# Patient Record
Sex: FEMALE | Race: WHITE | Employment: UNEMPLOYED | URBAN - METROPOLITAN AREA
[De-identification: names, ages, dates, MRNs, and addresses within clinical notes are randomized per-mention and may not be internally consistent; named-entity substitution may affect disease eponyms.]

---

## 2021-04-19 PROCEDURE — 88305 TISSUE EXAM BY PATHOLOGIST: CPT | Performed by: RADIOLOGY

## 2021-12-15 ENCOUNTER — HOSPITAL ENCOUNTER (EMERGENCY)
Age: 41
Discharge: HOME | End: 2021-12-15
Attending: EMERGENCY MEDICINE
Payer: COMMERCIAL

## 2021-12-15 VITALS
DIASTOLIC BLOOD PRESSURE: 66 MMHG | HEART RATE: 84 BPM | BODY MASS INDEX: 25.71 KG/M2 | HEIGHT: 66 IN | OXYGEN SATURATION: 97 % | RESPIRATION RATE: 16 BRPM | TEMPERATURE: 98 F | WEIGHT: 160 LBS | SYSTOLIC BLOOD PRESSURE: 105 MMHG

## 2021-12-15 DIAGNOSIS — M62.830 BACK MUSCLE SPASM: Primary | ICD-10-CM

## 2021-12-15 PROCEDURE — 99283 EMERGENCY DEPT VISIT LOW MDM: CPT | Performed by: EMERGENCY MEDICINE

## 2021-12-15 PROCEDURE — 96372 THER/PROPH/DIAG INJ SC/IM: CPT | Performed by: EMERGENCY MEDICINE

## 2021-12-15 RX ORDER — KETOROLAC TROMETHAMINE 30 MG/ML
60 INJECTION, SOLUTION INTRAMUSCULAR; INTRAVENOUS ONCE
Status: COMPLETED | OUTPATIENT
Start: 2021-12-15 | End: 2021-12-15

## 2021-12-15 RX ORDER — CYCLOBENZAPRINE HCL 10 MG
10 TABLET ORAL 3 TIMES DAILY PRN
Qty: 20 TABLET | Refills: 0 | Status: SHIPPED | OUTPATIENT
Start: 2021-12-15 | End: 2021-12-22

## 2021-12-15 NOTE — ED INITIAL ASSESSMENT (HPI)
l sided back pain for about one week- worse with movement- certain movement she feels pain wrap around to front of chest- no known injury

## 2021-12-15 NOTE — ED PROVIDER NOTES
Patient Seen in: THE [facility] Emergency Department In [place]      History   Patient presents with:  Back Pain    Stated Complaint: back pain    Subjective:   HPI    27-year-old female presents emergency room with chief complaint of left mid/upper back pain Triage Vitals [12/15/21 0802]   /66   Pulse 84   Resp 16   Temp 98.2 °F (36.8 °C)   Temp src Temporal   SpO2 97 %   O2 Device None (Room air)       Current:/66   Pulse 84   Temp 98.2 °F (36.8 °C) (Temporal)   Resp 16   Ht 167.6 cm (5' 6\")   Wt Discharge Medication List    START taking these medications    cyclobenzaprine 10 MG Oral Tab  Take 1 tablet (10 mg total) by mouth 3 (three) times daily as needed for Muscle spasms.   Qty: 20 tablet Refills: 0

## 2021-12-18 ENCOUNTER — HOSPITAL ENCOUNTER (EMERGENCY)
Facility: HOSPITAL | Age: 41
Discharge: HOME | End: 2021-12-18
Attending: EMERGENCY MEDICINE
Payer: COMMERCIAL

## 2021-12-18 ENCOUNTER — APPOINTMENT (OUTPATIENT)
Dept: GENERAL RADIOLOGY | Facility: HOSPITAL | Age: 41
End: 2021-12-18
Attending: EMERGENCY MEDICINE
Payer: COMMERCIAL

## 2021-12-18 VITALS
WEIGHT: 160 LBS | HEIGHT: 66 IN | DIASTOLIC BLOOD PRESSURE: 60 MMHG | HEART RATE: 61 BPM | TEMPERATURE: 99 F | SYSTOLIC BLOOD PRESSURE: 106 MMHG | BODY MASS INDEX: 25.71 KG/M2 | OXYGEN SATURATION: 96 % | RESPIRATION RATE: 16 BRPM

## 2021-12-18 DIAGNOSIS — R07.9 CHEST PAIN OF UNCERTAIN ETIOLOGY: Primary | ICD-10-CM

## 2021-12-18 PROCEDURE — 93010 ELECTROCARDIOGRAM REPORT: CPT

## 2021-12-18 PROCEDURE — 71045 X-RAY EXAM CHEST 1 VIEW: CPT | Performed by: EMERGENCY MEDICINE

## 2021-12-18 PROCEDURE — 99284 EMERGENCY DEPT VISIT MOD MDM: CPT

## 2021-12-18 PROCEDURE — 80053 COMPREHEN METABOLIC PANEL: CPT | Performed by: EMERGENCY MEDICINE

## 2021-12-18 PROCEDURE — 84484 ASSAY OF TROPONIN QUANT: CPT | Performed by: EMERGENCY MEDICINE

## 2021-12-18 PROCEDURE — 93005 ELECTROCARDIOGRAM TRACING: CPT

## 2021-12-18 PROCEDURE — 85025 COMPLETE CBC W/AUTO DIFF WBC: CPT | Performed by: EMERGENCY MEDICINE

## 2021-12-18 PROCEDURE — 36415 COLL VENOUS BLD VENIPUNCTURE: CPT

## 2021-12-18 PROCEDURE — 83690 ASSAY OF LIPASE: CPT | Performed by: EMERGENCY MEDICINE

## 2021-12-18 PROCEDURE — 81003 URINALYSIS AUTO W/O SCOPE: CPT | Performed by: EMERGENCY MEDICINE

## 2021-12-18 PROCEDURE — 85379 FIBRIN DEGRADATION QUANT: CPT | Performed by: EMERGENCY MEDICINE

## 2021-12-18 NOTE — ED PROVIDER NOTES
Patient Seen in: [facility] Emergency Department      History   Patient presents with:  Abnormal Result  Dizziness    Stated Complaint: here from [address] [place] [address] for abnormal EKG    Subjective:   HPI    The patient is a 45-year-old female with a history of MTHFR used    Alcohol use: Never    Drug use: Never             Review of Systems    Positive for stated complaint: here from [facility] for abnormal EKG  Other systems are as noted in HPI. Constitutional and vital signs reviewed.       All other systems reviewed and ne ALT 58 (*)     All other components within normal limits   CBC W/ DIFFERENTIAL - Abnormal; Notable for the following components:    Lymphocyte Absolute 4.23 (*)     All other components within normal limits   TROPONIN I HIGH SENSITIVITY - Normal   D-DIM within     normal limits. Mediastinum and mauricio are unremarkable.   Chest wall     structures are unremarkable.                               =====    CONCLUSION:  There is no evidence of active cardiopulmonary disease on     this single portable chest radi

## 2021-12-18 NOTE — ED INITIAL ASSESSMENT (HPI)
Pt arrives ambulatory to triage, states she went to [facility] for generalized weakness x1 week, wanted to rule out covid or sinus infection. ICC noted abnormal ECG, here it is NSR. Was at [facility] for same 3 days ago. A&O x4.

## 2023-01-11 ENCOUNTER — APPOINTMENT (OUTPATIENT)
Dept: CT IMAGING | Age: 43
End: 2023-01-11
Attending: EMERGENCY MEDICINE
Payer: COMMERCIAL

## 2023-01-11 ENCOUNTER — HOSPITAL ENCOUNTER (EMERGENCY)
Age: 43
Discharge: HOME | End: 2023-01-11
Attending: EMERGENCY MEDICINE
Payer: COMMERCIAL

## 2023-01-11 VITALS
DIASTOLIC BLOOD PRESSURE: 54 MMHG | WEIGHT: 170 LBS | HEART RATE: 70 BPM | SYSTOLIC BLOOD PRESSURE: 98 MMHG | TEMPERATURE: 99 F | BODY MASS INDEX: 27.32 KG/M2 | OXYGEN SATURATION: 98 % | RESPIRATION RATE: 18 BRPM | HEIGHT: 66 IN

## 2023-01-11 DIAGNOSIS — R42 VERTIGO: Primary | ICD-10-CM

## 2023-01-11 LAB
ALBUMIN SERPL-MCNC: 3.6 G/DL (ref 3.4–5)
ALBUMIN/GLOB SERPL: 1 {RATIO} (ref 1–2)
ALP LIVER SERPL-CCNC: 72 U/L
ALT SERPL-CCNC: 35 U/L
ANION GAP SERPL CALC-SCNC: 3 MMOL/L (ref 0–18)
AST SERPL-CCNC: 43 U/L (ref 15–37)
ATRIAL RATE: 71 BPM
B-HCG UR QL: NEGATIVE
BASOPHILS # BLD AUTO: 0.05 X10(3) UL (ref 0–0.2)
BASOPHILS NFR BLD AUTO: 0.4 %
BILIRUB SERPL-MCNC: 0.4 MG/DL (ref 0.1–2)
BUN BLD-MCNC: 14 MG/DL (ref 7–18)
CALCIUM BLD-MCNC: 9 MG/DL (ref 8.5–10.1)
CHLORIDE SERPL-SCNC: 107 MMOL/L (ref 98–112)
CO2 SERPL-SCNC: 27 MMOL/L (ref 21–32)
CREAT BLD-MCNC: 0.75 MG/DL
EOSINOPHIL # BLD AUTO: 0.22 X10(3) UL (ref 0–0.7)
EOSINOPHIL NFR BLD AUTO: 1.7 %
ERYTHROCYTE [DISTWIDTH] IN BLOOD BY AUTOMATED COUNT: 13.1 %
GFR SERPLBLD BASED ON 1.73 SQ M-ARVRAT: 103 ML/MIN/1.73M2 (ref 60–?)
GLOBULIN PLAS-MCNC: 3.5 G/DL (ref 2.8–4.4)
GLUCOSE BLD-MCNC: 103 MG/DL (ref 70–99)
HCT VFR BLD AUTO: 42.4 %
HGB BLD-MCNC: 14.6 G/DL
IMM GRANULOCYTES # BLD AUTO: 0.05 X10(3) UL (ref 0–1)
IMM GRANULOCYTES NFR BLD: 0.4 %
LYMPHOCYTES # BLD AUTO: 3.66 X10(3) UL (ref 1–4)
LYMPHOCYTES NFR BLD AUTO: 28.8 %
MCH RBC QN AUTO: 31 PG (ref 26–34)
MCHC RBC AUTO-ENTMCNC: 34.4 G/DL (ref 31–37)
MCV RBC AUTO: 90 FL
MONOCYTES # BLD AUTO: 0.95 X10(3) UL (ref 0.1–1)
MONOCYTES NFR BLD AUTO: 7.5 %
NEUTROPHILS # BLD AUTO: 7.8 X10 (3) UL (ref 1.5–7.7)
NEUTROPHILS # BLD AUTO: 7.8 X10(3) UL (ref 1.5–7.7)
NEUTROPHILS NFR BLD AUTO: 61.2 %
OSMOLALITY SERPL CALC.SUM OF ELEC: 285 MOSM/KG (ref 275–295)
P AXIS: 82 DEGREES
P-R INTERVAL: 142 MS
PLATELET # BLD AUTO: 334 10(3)UL (ref 150–450)
POTASSIUM SERPL-SCNC: 4.6 MMOL/L (ref 3.5–5.1)
PROT SERPL-MCNC: 7.1 G/DL (ref 6.4–8.2)
Q-T INTERVAL: 384 MS
QRS DURATION: 92 MS
QTC CALCULATION (BEZET): 417 MS
R AXIS: 31 DEGREES
RBC # BLD AUTO: 4.71 X10(6)UL
SODIUM SERPL-SCNC: 137 MMOL/L (ref 136–145)
T AXIS: 30 DEGREES
VENTRICULAR RATE: 71 BPM
WBC # BLD AUTO: 12.7 X10(3) UL (ref 4–11)

## 2023-01-11 PROCEDURE — 96374 THER/PROPH/DIAG INJ IV PUSH: CPT

## 2023-01-11 PROCEDURE — 70498 CT ANGIOGRAPHY NECK: CPT | Performed by: EMERGENCY MEDICINE

## 2023-01-11 PROCEDURE — 96375 TX/PRO/DX INJ NEW DRUG ADDON: CPT

## 2023-01-11 PROCEDURE — 99285 EMERGENCY DEPT VISIT HI MDM: CPT

## 2023-01-11 PROCEDURE — 80053 COMPREHEN METABOLIC PANEL: CPT | Performed by: EMERGENCY MEDICINE

## 2023-01-11 PROCEDURE — 70496 CT ANGIOGRAPHY HEAD: CPT | Performed by: EMERGENCY MEDICINE

## 2023-01-11 PROCEDURE — 93005 ELECTROCARDIOGRAM TRACING: CPT

## 2023-01-11 PROCEDURE — 81025 URINE PREGNANCY TEST: CPT

## 2023-01-11 PROCEDURE — 93010 ELECTROCARDIOGRAM REPORT: CPT

## 2023-01-11 PROCEDURE — 85025 COMPLETE CBC W/AUTO DIFF WBC: CPT | Performed by: EMERGENCY MEDICINE

## 2023-01-11 RX ORDER — CEFDINIR 300 MG/1
300 CAPSULE ORAL 2 TIMES DAILY
COMMUNITY

## 2023-01-11 RX ORDER — MECLIZINE HCL 12.5 MG/1
12.5 TABLET ORAL 3 TIMES DAILY PRN
COMMUNITY

## 2023-01-11 RX ORDER — LORAZEPAM 2 MG/ML
1 INJECTION INTRAMUSCULAR ONCE
Status: COMPLETED | OUTPATIENT
Start: 2023-01-11 | End: 2023-01-11

## 2023-01-11 RX ORDER — DIAZEPAM 5 MG/1
5 TABLET ORAL 3 TIMES DAILY PRN
Qty: 20 TABLET | Refills: 0 | Status: SHIPPED | OUTPATIENT
Start: 2023-01-11 | End: 2023-01-14

## 2023-01-11 RX ORDER — ONDANSETRON 2 MG/ML
4 INJECTION INTRAMUSCULAR; INTRAVENOUS ONCE
Status: COMPLETED | OUTPATIENT
Start: 2023-01-11 | End: 2023-01-11

## 2023-01-11 RX ORDER — MECLIZINE HYDROCHLORIDE 25 MG/1
25 TABLET ORAL 3 TIMES DAILY PRN
Qty: 20 TABLET | Refills: 0 | Status: SHIPPED | OUTPATIENT
Start: 2023-01-11

## 2023-01-11 RX ORDER — AZITHROMYCIN 250 MG/1
TABLET, FILM COATED ORAL
Qty: 6 TABLET | Refills: 0 | Status: SHIPPED | OUTPATIENT
Start: 2023-01-11 | End: 2023-01-16

## 2023-03-01 PROBLEM — R07.89 ATYPICAL CHEST PAIN: Status: ACTIVE | Noted: 2022-04-07

## 2023-03-01 PROBLEM — B35.4 TINEA CORPORIS: Status: ACTIVE | Noted: 2022-09-14

## 2023-03-01 PROBLEM — M89.9 DISORDER OF BONE: Status: ACTIVE | Noted: 2022-04-07

## 2023-03-01 PROBLEM — M48.00 SPINAL STENOSIS: Status: ACTIVE | Noted: 2022-04-07

## 2023-03-01 PROBLEM — M25.511 CHRONIC RIGHT SHOULDER PAIN: Status: ACTIVE | Noted: 2019-01-03

## 2023-03-01 PROBLEM — L25.9 CONTACT DERMATITIS: Status: ACTIVE | Noted: 2022-04-07

## 2023-03-01 PROBLEM — M47.812 SPONDYLOSIS OF CERVICAL SPINE: Status: ACTIVE | Noted: 2019-01-03

## 2023-03-01 PROBLEM — R42 DIZZINESS: Status: ACTIVE | Noted: 2022-04-07

## 2023-03-01 PROBLEM — M25.559 ARTHRALGIA OF HIP: Status: ACTIVE | Noted: 2022-04-07

## 2023-03-01 PROBLEM — H65.90 FLUID LEVEL BEHIND TYMPANIC MEMBRANE: Status: ACTIVE | Noted: 2022-04-07

## 2023-03-01 PROBLEM — H65.00: Status: ACTIVE | Noted: 2022-09-14

## 2023-03-01 PROBLEM — G89.29 CHRONIC RIGHT SHOULDER PAIN: Status: ACTIVE | Noted: 2019-01-03

## 2023-03-02 ENCOUNTER — OFFICE VISIT (OUTPATIENT)
Dept: SURGERY | Facility: CLINIC | Age: 43
End: 2023-03-02
Payer: COMMERCIAL

## 2023-03-02 ENCOUNTER — TELEPHONE (OUTPATIENT)
Dept: SURGERY | Facility: CLINIC | Age: 43
End: 2023-03-02

## 2023-03-02 VITALS
HEIGHT: 66 IN | BODY MASS INDEX: 27.32 KG/M2 | WEIGHT: 170 LBS | SYSTOLIC BLOOD PRESSURE: 116 MMHG | DIASTOLIC BLOOD PRESSURE: 62 MMHG | HEART RATE: 86 BPM

## 2023-03-02 DIAGNOSIS — M54.2 CERVICAL PAIN: ICD-10-CM

## 2023-03-02 DIAGNOSIS — M47.812 CERVICAL SPONDYLOSIS: Primary | ICD-10-CM

## 2023-03-02 DIAGNOSIS — M51.16 LUMBAR DISC HERNIATION WITH RADICULOPATHY: ICD-10-CM

## 2023-03-02 DIAGNOSIS — M47.816 LUMBAR SPONDYLOSIS: ICD-10-CM

## 2023-03-02 PROCEDURE — 3008F BODY MASS INDEX DOCD: CPT | Performed by: PHYSICIAN ASSISTANT

## 2023-03-02 PROCEDURE — 99204 OFFICE O/P NEW MOD 45 MIN: CPT | Performed by: PHYSICIAN ASSISTANT

## 2023-03-02 PROCEDURE — 3078F DIAST BP <80 MM HG: CPT | Performed by: PHYSICIAN ASSISTANT

## 2023-03-02 PROCEDURE — 3074F SYST BP LT 130 MM HG: CPT | Performed by: PHYSICIAN ASSISTANT

## 2023-03-02 RX ORDER — MELOXICAM 15 MG/1
TABLET ORAL
COMMUNITY
Start: 2023-02-16

## 2023-03-02 NOTE — PROGRESS NOTES
New patient:  Reason for visit:  Cervical stenosis     Estimated time of onset:  year(s)    Numeric Rating Scale:  Pain at Present:  8-9/10                                                                                                                       Distribution of Pain:    bilateral      Prior diagnostic testing related to this condition:   CTA brain 01/11/23

## 2023-03-02 NOTE — TELEPHONE ENCOUNTER
Pt brought in imaging disc to upload and has been uploaded successfully to PACS; CT Sinus wo contrast DOS-2/21/22; C Spine lat DOS-1/31/23; Disc returned pt

## 2023-03-07 ENCOUNTER — TELEPHONE (OUTPATIENT)
Dept: SURGERY | Facility: CLINIC | Age: 43
End: 2023-03-07

## 2023-03-07 NOTE — TELEPHONE ENCOUNTER
Noted the PSR message listed below     Called pt to inform that Dr. Cara Banks office is requesting information and an MARCELL is needed in order for our office to comply.      NO answer, LMTCB

## 2023-03-07 NOTE — TELEPHONE ENCOUNTER
Office from Dr. Rasheeda Chavira is requesting last office note and any imaging report to be faxed to [phone] office will be treating pt for tmj disorder

## 2023-03-16 NOTE — TELEPHONE ENCOUNTER
Received MARCELL from patient faxed over [address] from [facility] to Dr. Elvira kang

## 2023-03-23 ENCOUNTER — TELEPHONE (OUTPATIENT)
Dept: SURGERY | Facility: CLINIC | Age: 43
End: 2023-03-23

## 2023-03-23 NOTE — TELEPHONE ENCOUNTER
Rec'd DOS 03/07/2023 examination findings. Endorsed to RN for provider review. Placed in bin for pickup.

## 2023-03-24 ENCOUNTER — TELEPHONE (OUTPATIENT)
Dept: SURGERY | Facility: CLINIC | Age: 43
End: 2023-03-24

## 2023-03-24 NOTE — TELEPHONE ENCOUNTER
Olga Jackman from [facility] insurance verification is calling to obtain PA for MRI please cb [phone]

## 2023-03-27 NOTE — TELEPHONE ENCOUNTER
[facility] office note received by [facility] clinical staff, endorsed to provider for review. Placed on [employer] desk. Will be sent to scanning once received back from provider.

## 2023-03-29 ENCOUNTER — TELEPHONE (OUTPATIENT)
Dept: NEUROLOGY | Facility: CLINIC | Age: 43
End: 2023-03-29

## 2023-03-29 ENCOUNTER — TELEPHONE (OUTPATIENT)
Dept: SURGERY | Facility: CLINIC | Age: 43
End: 2023-03-29

## 2023-03-29 NOTE — TELEPHONE ENCOUNTER
Rec'd imaging report of MRI cervical spine w/o contrast, from [facility], dated on 3/29/23; placed in nurses bin for review

## 2023-03-30 ENCOUNTER — TELEPHONE (OUTPATIENT)
Dept: SURGERY | Facility: CLINIC | Age: 43
End: 2023-03-30

## 2023-03-30 NOTE — TELEPHONE ENCOUNTER
MRI Cervical Spine report from [facility] received by [facility] clinical staff, endorsed to provider for review. Placed on PATTI Kumar's desk. Will be sent to scanning once received back from provider.

## 2023-03-30 NOTE — TELEPHONE ENCOUNTER
MRI Lumbar Spine report from [facility] received by [facility] clinical staff, endorsed to provider for review. Placed on PATTI Kumar's desk    Will be sent to scanning once received back from provider.

## 2023-03-30 NOTE — TELEPHONE ENCOUNTER
Rad/Lumb spine Report from [facility] received by [facility] clinical staff, endorsed to provider for review. Placed on PATTI Kumar's desk     Will be sent to scanning once received back from provider.

## 2023-04-10 ENCOUNTER — TELEPHONE (OUTPATIENT)
Dept: SURGERY | Facility: CLINIC | Age: 43
End: 2023-04-10

## 2023-04-10 ENCOUNTER — OFFICE VISIT (OUTPATIENT)
Dept: SURGERY | Facility: CLINIC | Age: 43
End: 2023-04-10
Payer: COMMERCIAL

## 2023-04-10 VITALS
SYSTOLIC BLOOD PRESSURE: 110 MMHG | HEART RATE: 90 BPM | HEIGHT: 66 IN | DIASTOLIC BLOOD PRESSURE: 80 MMHG | WEIGHT: 170 LBS | BODY MASS INDEX: 27.32 KG/M2

## 2023-04-10 DIAGNOSIS — M47.816 LUMBAR SPONDYLOSIS: ICD-10-CM

## 2023-04-10 DIAGNOSIS — M54.12 CERVICAL MYELOPATHY WITH CERVICAL RADICULOPATHY (HCC): Primary | ICD-10-CM

## 2023-04-10 DIAGNOSIS — G95.9 CERVICAL MYELOPATHY WITH CERVICAL RADICULOPATHY (HCC): Primary | ICD-10-CM

## 2023-04-10 PROCEDURE — 3074F SYST BP LT 130 MM HG: CPT | Performed by: PHYSICIAN ASSISTANT

## 2023-04-10 PROCEDURE — 3008F BODY MASS INDEX DOCD: CPT | Performed by: PHYSICIAN ASSISTANT

## 2023-04-10 PROCEDURE — 99214 OFFICE O/P EST MOD 30 MIN: CPT | Performed by: PHYSICIAN ASSISTANT

## 2023-04-10 PROCEDURE — 3079F DIAST BP 80-89 MM HG: CPT | Performed by: PHYSICIAN ASSISTANT

## 2023-04-10 RX ORDER — ALBUTEROL SULFATE 90 UG/1
2 AEROSOL, METERED RESPIRATORY (INHALATION) EVERY 4 HOURS
COMMUNITY
Start: 2023-03-26

## 2023-04-19 ENCOUNTER — OFFICE VISIT (OUTPATIENT)
Dept: SURGERY | Facility: CLINIC | Age: 43
End: 2023-04-19
Payer: COMMERCIAL

## 2023-04-19 VITALS
DIASTOLIC BLOOD PRESSURE: 70 MMHG | OXYGEN SATURATION: 97 % | WEIGHT: 170 LBS | SYSTOLIC BLOOD PRESSURE: 122 MMHG | HEART RATE: 74 BPM | BODY MASS INDEX: 27.32 KG/M2 | HEIGHT: 66 IN

## 2023-04-19 DIAGNOSIS — M54.6 ACUTE MIDLINE THORACIC BACK PAIN: Primary | ICD-10-CM

## 2023-04-19 PROCEDURE — 3078F DIAST BP <80 MM HG: CPT | Performed by: NEUROLOGICAL SURGERY

## 2023-04-19 PROCEDURE — 99203 OFFICE O/P NEW LOW 30 MIN: CPT | Performed by: NEUROLOGICAL SURGERY

## 2023-04-19 PROCEDURE — 3008F BODY MASS INDEX DOCD: CPT | Performed by: NEUROLOGICAL SURGERY

## 2023-04-19 PROCEDURE — 3074F SYST BP LT 130 MM HG: CPT | Performed by: NEUROLOGICAL SURGERY

## 2023-05-17 ENCOUNTER — HOSPITAL ENCOUNTER (OUTPATIENT)
Dept: MRI IMAGING | Age: 43
Discharge: HOME | End: 2023-05-17
Attending: NEUROLOGICAL SURGERY
Payer: COMMERCIAL

## 2023-05-17 DIAGNOSIS — M54.6 ACUTE MIDLINE THORACIC BACK PAIN: ICD-10-CM

## 2023-05-17 PROCEDURE — 72146 MRI CHEST SPINE W/O DYE: CPT | Performed by: NEUROLOGICAL SURGERY

## 2023-05-18 ENCOUNTER — OFFICE VISIT (OUTPATIENT)
Dept: PHYSICAL MEDICINE AND REHAB | Facility: CLINIC | Age: 43
End: 2023-05-18
Payer: COMMERCIAL

## 2023-05-18 VITALS
HEIGHT: 66 IN | BODY MASS INDEX: 27.69 KG/M2 | WEIGHT: 172.31 LBS | SYSTOLIC BLOOD PRESSURE: 122 MMHG | DIASTOLIC BLOOD PRESSURE: 72 MMHG

## 2023-05-18 DIAGNOSIS — M89.8X1 CHRONIC SCAPULAR PAIN: ICD-10-CM

## 2023-05-18 DIAGNOSIS — M54.2 NECK PAIN: ICD-10-CM

## 2023-05-18 DIAGNOSIS — G89.29 CHRONIC SCAPULAR PAIN: ICD-10-CM

## 2023-05-18 DIAGNOSIS — M54.9 UPPER BACK PAIN: Primary | ICD-10-CM

## 2023-05-18 PROCEDURE — 3008F BODY MASS INDEX DOCD: CPT | Performed by: PHYSICAL MEDICINE & REHABILITATION

## 2023-05-18 PROCEDURE — 3074F SYST BP LT 130 MM HG: CPT | Performed by: PHYSICAL MEDICINE & REHABILITATION

## 2023-05-18 PROCEDURE — 99204 OFFICE O/P NEW MOD 45 MIN: CPT | Performed by: PHYSICAL MEDICINE & REHABILITATION

## 2023-05-18 PROCEDURE — 3078F DIAST BP <80 MM HG: CPT | Performed by: PHYSICAL MEDICINE & REHABILITATION

## 2023-05-18 RX ORDER — DULOXETIN HYDROCHLORIDE 20 MG/1
20 CAPSULE, DELAYED RELEASE ORAL DAILY
Qty: 30 CAPSULE | Refills: 0 | Status: SHIPPED | OUTPATIENT
Start: 2023-05-18

## 2023-05-18 RX ORDER — ASPIRIN 81 MG/1
81 TABLET ORAL DAILY
COMMUNITY
Start: 2023-05-01

## 2023-05-18 NOTE — PATIENT INSTRUCTIONS
Get started in physical therapy working on your upper back  Start on cymbalta. Try this for a least a week before discontinuing. Follow up with me in about 6 weeks after some therapy and we will consider escalation of your care depending on your response to the above.

## 2023-05-31 ENCOUNTER — TELEPHONE (OUTPATIENT)
Dept: SURGERY | Facility: CLINIC | Age: 43
End: 2023-05-31

## 2023-05-31 ENCOUNTER — TELEPHONE (OUTPATIENT)
Dept: PHYSICAL THERAPY | Facility: HOSPITAL | Age: 43
End: 2023-05-31

## 2023-05-31 NOTE — TELEPHONE ENCOUNTER
Received overdue result notice for XR Thoracic Spine from 4/19/2023      Per LOV note:    \"Plan:  1. Obtain T spine XR  2. Obtain MRI T spine  3. Recommend PMR referral to Dr. Mario Smith for myofascial syndrome  4. F/U to review imaging  5.  Do not recommend surgery at this time\"    Phone call completed:    Spoke with patient and advised that XR of T Spine is still needed, pt states understanding and will complete  Pt will also contact office and make follow up appointment to discuss results with Dr Shade Farias

## 2023-06-05 ENCOUNTER — OFFICE VISIT (OUTPATIENT)
Dept: PHYSICAL THERAPY | Age: 43
End: 2023-06-05
Attending: PHYSICAL MEDICINE & REHABILITATION
Payer: COMMERCIAL

## 2023-06-05 DIAGNOSIS — M89.8X1 CHRONIC SCAPULAR PAIN: ICD-10-CM

## 2023-06-05 DIAGNOSIS — M54.9 UPPER BACK PAIN: ICD-10-CM

## 2023-06-05 DIAGNOSIS — M54.2 NECK PAIN: ICD-10-CM

## 2023-06-05 DIAGNOSIS — G89.29 CHRONIC SCAPULAR PAIN: ICD-10-CM

## 2023-06-05 PROCEDURE — 97162 PT EVAL MOD COMPLEX 30 MIN: CPT

## 2023-06-05 PROCEDURE — 97110 THERAPEUTIC EXERCISES: CPT

## 2023-06-07 ENCOUNTER — OFFICE VISIT (OUTPATIENT)
Dept: PHYSICAL THERAPY | Age: 43
End: 2023-06-07
Attending: PHYSICAL MEDICINE & REHABILITATION
Payer: COMMERCIAL

## 2023-06-07 PROCEDURE — 97110 THERAPEUTIC EXERCISES: CPT

## 2023-06-07 PROCEDURE — 97140 MANUAL THERAPY 1/> REGIONS: CPT

## 2023-06-07 NOTE — PROGRESS NOTES
Dx: Cervical Instability         Authorized # of Visits:  12 (PPO)         Next MD visit: none scheduled  Fall Risk: standard         Precautions: n/a           Goal Number: 12    Subjective: States that her symptoms are unchanged. Left upper trapezius tightness and mid thoracic \"pinch\" pain. Objective: Treatment initiated per treatment log. Date:6/7/2023 TX#: 2/12 Date:               TX#: 3/   Date:               TX#: 4/ Date:               TX#: 5/ Date:               TX#: 6/ Date:               TX#: 7/ Date:               TX#: 8/   VAS 4/10 VAS /10 VAS /10 VAS /10 VAS /10 VAS /10 VAS /10   TherEx (25 Min)         UBE 6 min 3/3         Supine Rows and diagonals red x 20         Rhythmic stabilization in neutral and at 45 degree rotation bilaterally. Pec Stretch 30 sec x 3         Scapular \"w\" yellow x 20         Lat Arm Elevation yellow x 10         [place] and Arrows x 10         Manual PT (15 Min)         UT stretch 30 sec x 3         Thoracic PA manipulation GR II and III                    Assessment: The patient responded well to today's intervention. Was able to replicate HEP issued during initial exam.        Goals (12 visits):    1. Increase FOTO assessment > 11% from INE to DC. 2. Patient will be aware of postural limitations and be able to correct them independently. 3. Patient will have an increase in spinal mobility to >75% in order to return to sustained posturing and looking over her shoulder. .    4. Patient will have an increase in core strength to assist with returning to sustained posturing and exercise at a local health and wellness facility. 5. Patient will demonstrate an increase in MLT of the upper trapezius in order to return to sustained posturing and exercise. Plan: Assess response to today's treatment and progress as tolerated. Charges: TherEx 2 (25 min);  Manual PT 1 (15 min)      Total Timed Treatment: 40 min  Total Treatment Time: 40 min

## 2023-06-08 ENCOUNTER — TELEPHONE (OUTPATIENT)
Dept: PHYSICAL THERAPY | Facility: HOSPITAL | Age: 43
End: 2023-06-08

## 2023-06-09 ENCOUNTER — TELEPHONE (OUTPATIENT)
Dept: PHYSICAL MEDICINE AND REHAB | Facility: CLINIC | Age: 43
End: 2023-06-09

## 2023-06-09 NOTE — TELEPHONE ENCOUNTER
LVMTCB pt scheduled incorrectly onto the [facility] schedule for her f/up please reschedule pts followup on the correct schedule.  Pts last visit was at the [facility]

## 2023-06-15 ENCOUNTER — HOSPITAL ENCOUNTER (OUTPATIENT)
Dept: GENERAL RADIOLOGY | Age: 43
Discharge: HOME | End: 2023-06-15
Attending: NEUROLOGICAL SURGERY
Payer: COMMERCIAL

## 2023-06-15 DIAGNOSIS — M54.6 ACUTE MIDLINE THORACIC BACK PAIN: ICD-10-CM

## 2023-06-15 PROCEDURE — 72070 X-RAY EXAM THORAC SPINE 2VWS: CPT | Performed by: NEUROLOGICAL SURGERY

## 2023-06-21 ENCOUNTER — OFFICE VISIT (OUTPATIENT)
Dept: SURGERY | Facility: CLINIC | Age: 43
End: 2023-06-21
Payer: COMMERCIAL

## 2023-06-21 VITALS
SYSTOLIC BLOOD PRESSURE: 120 MMHG | HEART RATE: 80 BPM | DIASTOLIC BLOOD PRESSURE: 70 MMHG | BODY MASS INDEX: 27.64 KG/M2 | HEIGHT: 66 IN | WEIGHT: 172 LBS

## 2023-06-21 DIAGNOSIS — M54.16 LUMBAR RADICULOPATHY: Primary | ICD-10-CM

## 2023-06-21 PROCEDURE — 3008F BODY MASS INDEX DOCD: CPT | Performed by: NEUROLOGICAL SURGERY

## 2023-06-21 PROCEDURE — 99213 OFFICE O/P EST LOW 20 MIN: CPT | Performed by: NEUROLOGICAL SURGERY

## 2023-06-21 PROCEDURE — 3078F DIAST BP <80 MM HG: CPT | Performed by: NEUROLOGICAL SURGERY

## 2023-06-21 PROCEDURE — 3074F SYST BP LT 130 MM HG: CPT | Performed by: NEUROLOGICAL SURGERY

## 2023-06-21 RX ORDER — PREDNISONE 20 MG/1
TABLET ORAL
COMMUNITY
Start: 2023-06-18

## 2023-06-21 RX ORDER — BENZONATATE 200 MG/1
CAPSULE ORAL
COMMUNITY
Start: 2023-06-18

## 2023-06-21 NOTE — PROGRESS NOTES
Established patient:  Reason for follow up:  Review imaging, THORACIC      Numeric Rating Scale: (No Pain) 0  to  10 (Worst Pain)        Pain at Present:  8/10       Distribution of Pain:    bilateral    Most recent treatments for Current Pain Condition:   Physical Therapy and NSAIDS  Response to treatment: some relief    New imaging or testing since your last office visit:      XR THORACIC SPINE 6.15.23  MRI SPINE THORACIC  5.17.23

## 2023-06-22 ENCOUNTER — NURSE ONLY (OUTPATIENT)
Dept: PHYSICAL THERAPY | Age: 43
End: 2023-06-22
Attending: PHYSICAL MEDICINE & REHABILITATION
Payer: COMMERCIAL

## 2023-06-22 PROCEDURE — 97140 MANUAL THERAPY 1/> REGIONS: CPT

## 2023-06-22 PROCEDURE — 97110 THERAPEUTIC EXERCISES: CPT

## 2023-06-22 PROCEDURE — 97112 NEUROMUSCULAR REEDUCATION: CPT

## 2023-06-22 NOTE — PROGRESS NOTES
Dx: Cervical Instability         Authorized # of Visits:  12 (PPO)         Next MD visit: none scheduled  Fall Risk: standard         Precautions: n/a           Goal Number: 12    Subjective: States that her symptoms are unchanged. Left upper trapezius tightness and mid thoracic \"pinch\" pain. Objective: Treatment initiated per treatment log. Date:6/7/2023 TX#: 2/12 Date:               TX#: 3/   Date:               TX#: 4/ Date:               TX#: 5/ Date:               TX#: 6/ Date:               TX#: 7/ Date:               TX#: 8/   VAS 4/10 VAS /10 VAS /10 VAS /10 VAS /10 VAS /10 VAS /10   TherEx (25 Min)         UBE 6 min 3/3         Supine Rows and diagonals red x 20         Rhythmic stabilization in neutral and at 45 degree rotation bilaterally. Pec Stretch 30 sec x 3         Scapular \"w\" yellow x 20         Lat Arm Elevation yellow x 10         [place] and Arrows x 10         Manual PT (15 Min)         UT stretch 30 sec x 3         Thoracic PA manipulation GR II and III                    Assessment: The patient responded well to today's intervention. Was able to replicate HEP issued during initial exam.        Goals (12 visits):    1. Increase FOTO assessment > 11% from INE to DC. 2. Patient will be aware of postural limitations and be able to correct them independently. 3. Patient will have an increase in spinal mobility to >75% in order to return to sustained posturing and looking over her shoulder. .    4. Patient will have an increase in core strength to assist with returning to sustained posturing and exercise at a local health and wellness facility. 5. Patient will demonstrate an increase in MLT of the upper trapezius in order to return to sustained posturing and exercise. Plan: Assess response to today's treatment and progress as tolerated. Charges: TherEx 2 (25 min);  Manual PT 1 (15 min)      Total Timed Treatment: 40 min  Total Treatment Time: 40 min

## 2023-06-23 ENCOUNTER — HOSPITAL ENCOUNTER (EMERGENCY)
Facility: HOSPITAL | Age: 43
Discharge: HOME | End: 2023-06-23
Attending: EMERGENCY MEDICINE
Payer: COMMERCIAL

## 2023-06-23 ENCOUNTER — APPOINTMENT (OUTPATIENT)
Dept: CT IMAGING | Facility: HOSPITAL | Age: 43
End: 2023-06-23
Attending: EMERGENCY MEDICINE
Payer: COMMERCIAL

## 2023-06-23 VITALS
SYSTOLIC BLOOD PRESSURE: 95 MMHG | OXYGEN SATURATION: 97 % | TEMPERATURE: 97 F | HEART RATE: 64 BPM | DIASTOLIC BLOOD PRESSURE: 46 MMHG | WEIGHT: 175 LBS | BODY MASS INDEX: 28.13 KG/M2 | HEIGHT: 66 IN | RESPIRATION RATE: 20 BRPM

## 2023-06-23 DIAGNOSIS — E87.1 HYPONATREMIA: ICD-10-CM

## 2023-06-23 DIAGNOSIS — H81.10 BENIGN PAROXYSMAL POSITIONAL VERTIGO, UNSPECIFIED LATERALITY: Primary | ICD-10-CM

## 2023-06-23 DIAGNOSIS — D72.829 LEUKOCYTOSIS, UNSPECIFIED TYPE: ICD-10-CM

## 2023-06-23 LAB
ALBUMIN SERPL-MCNC: 3.8 G/DL (ref 3.4–5)
ALBUMIN/GLOB SERPL: 1 {RATIO} (ref 1–2)
ALP LIVER SERPL-CCNC: 73 U/L
ALT SERPL-CCNC: 59 U/L
ANION GAP SERPL CALC-SCNC: 4 MMOL/L (ref 0–18)
AST SERPL-CCNC: 39 U/L (ref 15–37)
BASOPHILS # BLD AUTO: 0.07 X10(3) UL (ref 0–0.2)
BASOPHILS NFR BLD AUTO: 0.5 %
BILIRUB SERPL-MCNC: 0.2 MG/DL (ref 0.1–2)
BUN BLD-MCNC: 12 MG/DL (ref 7–18)
CALCIUM BLD-MCNC: 9.4 MG/DL (ref 8.5–10.1)
CHLORIDE SERPL-SCNC: 108 MMOL/L (ref 98–112)
CO2 SERPL-SCNC: 23 MMOL/L (ref 21–32)
CREAT BLD-MCNC: 0.74 MG/DL
EOSINOPHIL # BLD AUTO: 0.19 X10(3) UL (ref 0–0.7)
EOSINOPHIL NFR BLD AUTO: 1.2 %
ERYTHROCYTE [DISTWIDTH] IN BLOOD BY AUTOMATED COUNT: 12.7 %
GFR SERPLBLD BASED ON 1.73 SQ M-ARVRAT: 104 ML/MIN/1.73M2 (ref 60–?)
GLOBULIN PLAS-MCNC: 3.7 G/DL (ref 2.8–4.4)
GLUCOSE BLD-MCNC: 111 MG/DL (ref 70–99)
HCT VFR BLD AUTO: 41.1 %
HGB BLD-MCNC: 14.3 G/DL
IMM GRANULOCYTES # BLD AUTO: 0.05 X10(3) UL (ref 0–1)
IMM GRANULOCYTES NFR BLD: 0.3 %
LYMPHOCYTES # BLD AUTO: 3.06 X10(3) UL (ref 1–4)
LYMPHOCYTES NFR BLD AUTO: 19.9 %
MAGNESIUM SERPL-MCNC: 2 MG/DL (ref 1.6–2.6)
MCH RBC QN AUTO: 30.7 PG (ref 26–34)
MCHC RBC AUTO-ENTMCNC: 34.8 G/DL (ref 31–37)
MCV RBC AUTO: 88.2 FL
MONOCYTES # BLD AUTO: 0.96 X10(3) UL (ref 0.1–1)
MONOCYTES NFR BLD AUTO: 6.3 %
NEUTROPHILS # BLD AUTO: 11.03 X10 (3) UL (ref 1.5–7.7)
NEUTROPHILS # BLD AUTO: 11.03 X10(3) UL (ref 1.5–7.7)
NEUTROPHILS NFR BLD AUTO: 71.8 %
OSMOLALITY SERPL CALC.SUM OF ELEC: 280 MOSM/KG (ref 275–295)
PLATELET # BLD AUTO: 359 10(3)UL (ref 150–450)
POTASSIUM SERPL-SCNC: 4.1 MMOL/L (ref 3.5–5.1)
PROT SERPL-MCNC: 7.5 G/DL (ref 6.4–8.2)
RBC # BLD AUTO: 4.66 X10(6)UL
SODIUM SERPL-SCNC: 135 MMOL/L (ref 136–145)
WBC # BLD AUTO: 15.4 X10(3) UL (ref 4–11)

## 2023-06-23 PROCEDURE — 96374 THER/PROPH/DIAG INJ IV PUSH: CPT

## 2023-06-23 PROCEDURE — 99284 EMERGENCY DEPT VISIT MOD MDM: CPT

## 2023-06-23 PROCEDURE — 99285 EMERGENCY DEPT VISIT HI MDM: CPT

## 2023-06-23 PROCEDURE — 80053 COMPREHEN METABOLIC PANEL: CPT | Performed by: EMERGENCY MEDICINE

## 2023-06-23 PROCEDURE — 70450 CT HEAD/BRAIN W/O DYE: CPT | Performed by: EMERGENCY MEDICINE

## 2023-06-23 PROCEDURE — 96375 TX/PRO/DX INJ NEW DRUG ADDON: CPT

## 2023-06-23 PROCEDURE — 85025 COMPLETE CBC W/AUTO DIFF WBC: CPT | Performed by: EMERGENCY MEDICINE

## 2023-06-23 PROCEDURE — 96361 HYDRATE IV INFUSION ADD-ON: CPT

## 2023-06-23 PROCEDURE — 83735 ASSAY OF MAGNESIUM: CPT | Performed by: EMERGENCY MEDICINE

## 2023-06-23 PROCEDURE — 87430 STREP A AG IA: CPT | Performed by: EMERGENCY MEDICINE

## 2023-06-23 RX ORDER — ONDANSETRON 2 MG/ML
4 INJECTION INTRAMUSCULAR; INTRAVENOUS ONCE
Status: COMPLETED | OUTPATIENT
Start: 2023-06-23 | End: 2023-06-23

## 2023-06-23 RX ORDER — DIAZEPAM 5 MG/1
5 TABLET ORAL 3 TIMES DAILY PRN
Qty: 20 TABLET | Refills: 0 | Status: SHIPPED | OUTPATIENT
Start: 2023-06-23

## 2023-06-23 RX ORDER — MECLIZINE HYDROCHLORIDE 25 MG/1
25 TABLET ORAL 3 TIMES DAILY PRN
Qty: 30 TABLET | Refills: 0 | Status: SHIPPED | OUTPATIENT
Start: 2023-06-23

## 2023-06-23 RX ORDER — ONDANSETRON 4 MG/1
4 TABLET, ORALLY DISINTEGRATING ORAL EVERY 8 HOURS PRN
Qty: 10 TABLET | Refills: 0 | Status: SHIPPED | OUTPATIENT
Start: 2023-06-23 | End: 2023-07-03

## 2023-06-23 RX ORDER — DIAZEPAM 5 MG/ML
5 INJECTION, SOLUTION INTRAMUSCULAR; INTRAVENOUS ONCE
Status: COMPLETED | OUTPATIENT
Start: 2023-06-23 | End: 2023-06-23

## 2023-06-23 RX ORDER — MECLIZINE HYDROCHLORIDE 25 MG/1
50 TABLET ORAL ONCE
Status: COMPLETED | OUTPATIENT
Start: 2023-06-23 | End: 2023-06-23

## 2023-06-24 NOTE — PROGRESS NOTES
Dx: Cervical Instability         Authorized # of Visits:  12 (PPO)         Next MD visit: none scheduled  Fall Risk: standard         Precautions: n/a           Goal Number: 12    Subjective: pt upset that she is having so much pain and difficulty and no one knows why and nothing much is helping her. She saw the neurologist recently and he told her she has scapular dyskinesis. Objective: Treatment initiated per treatment log.  - Increased tenderness/tightness left sub scap/around scar and over scar, left upper/mid traps/levator scapula,mid thoracic costovertebral junctions. -decreased T-spine and left post rib mobility   -decreased c-spine arom most planes with increased pain with rotations and side bending    Date:6/7/2023 TX#: 2/12 Date:               TX#: 3/   Date:               TX#: 4/ Date:               TX#: 5/ Date:               TX#: 6/ Date:               TX#: 7/ Date:               TX#: 8/   VAS 4/10 VAS 8 /10 VAS /10 VAS /10 VAS /10 VAS /10 VAS /10   TherEx (25 Min) TherEx (8min)        UBE 6 min 3/3 Not today        Supine Rows and diagonals red x 20 Sitting/standing c-spine arom all planes except flexion 5x 5 sec         Rhythmic stabilization in neutral and at 45 degree rotation bilaterally.    Not today        Pec Stretch 30 sec x 3 Not today        Scapular \"w\" yellow x 20 Supine c-spine arom all planes except flexion 5 to 10x 5 to 10 sec        Lat Arm Elevation yellow x 10 Not today        [place] and Arrows x 10 Not today                      Manual PT (15 Min) Supine B scapulae retraction and depression -reaching for feet 10x 10 sec  Prone B scapulae retraction 10x 10 sec  Manual PT (23 min)  Hot pack to neck/upper back prior to manual 10 min        UT stretch 30 sec x 3 Sitting stm/graston to left upper/mid traps/levator scap        Thoracic PA manipulation GR II and III Supine left subscap release, stm/graston around scar          Supine occipital release and gentle c-spine distraction and prom to all planes except flexion  PA mobes to T-spine and B post ribs left more than right           Neuro re-education (8 min)  Reviewed proper head, shoulder, neck alignment and proper s-h rhythm  Posture check against the wall  k-tape to either side of scar in axilla y shape- and to left shoulder-y shape lateral and one I strip ant to post in sagittal plane and one transverse plane pt instructed in purpose/wearing/removal of tape          Assessment:pt appears frustrated that she continues to have increased pain and unable to get relief; therefore, new manual therapy (stm/grastrev) and k-tape were added to decrease tissue tightness and increase flexibility. Some ex not performed due to pain. Added gentle c-spine rom stretching ex to maintain newly gained ranges. Pt understood instruction/education discussed above. Goals (12 visits):    1. Increase FOTO assessment > 11% from INE to DC. 2. Patient will be aware of postural limitations and be able to correct them independently. 3. Patient will have an increase in spinal mobility to >75% in order to return to sustained posturing and looking over her shoulder. .    4. Patient will have an increase in core strength to assist with returning to sustained posturing and exercise at a local health and wellness facility. 5. Patient will demonstrate an increase in MLT of the upper trapezius in order to return to sustained posturing and exercise. Plan: Assess response to today's treatment and progress as tolerated. Consider electric stim to help with pain management       Charges: TherEx x1 (8 min);  Manual PT x2 (23 min) , neuro re-ed x1 (20 min)    Total Timed Treatment: 51 min  Total Treatment Time: 51 min

## 2023-06-26 ENCOUNTER — TELEPHONE (OUTPATIENT)
Dept: PHYSICAL THERAPY | Facility: HOSPITAL | Age: 43
End: 2023-06-26

## 2023-06-26 ENCOUNTER — APPOINTMENT (OUTPATIENT)
Dept: PHYSICAL THERAPY | Age: 43
End: 2023-06-26
Attending: PHYSICAL MEDICINE & REHABILITATION
Payer: COMMERCIAL

## 2023-06-26 NOTE — TELEPHONE ENCOUNTER
Pre- Procedure [address] [place]   11/11/1980   [id number]     Height:  Weight:  BMI:  BMI Between 40-44.9 needs chart review from [facility]. BMI 45 or above needs to be done at [facility].        General Questions Y N   Are you currently experiencing any new GI symptoms? [] []     The patient must answer YES to one of the following 2 questions in order to schedule a SCREENING colonoscopy: Y N     Is the patient 39years old or older? [] []    IF NO, does the patient's mother, father or sibling(s) have a history of colon cancer or colon polyps? [] []     Y N   Has the patient ever had a colonoscopy in the past? [] []     If yes, where? If the patient answers YES to ANY of the following 3 questions, they must have an office appointment: Corrine Huston you take any blood thinners (Coumadin (Warfarin)/ Plavix (Clopidogrel)/ Xarelto (Rivaroxaban)/ Pradaxa (Dabigatran) / Eliquis (Apixaban) / Effient (Prasugrel), Brilinta (Ticagrelor), Vorapaxar, Ticlodipine (Ticlid), Dipyridamole (Persantine), Aggrenox, Edoxaban (Savaysa), Fondaparinux (Arixtra) or Desirudin (Iprivask)):  [] []     If Yes, who manages this? Do you use oxygen at home? [] []   Do you have a history of a stroke? [] []     If the patient answers YES to ANY of the following 3 questions, they must be scheduled at [facility]: Angel Nguyen   Do you have a defibrillator or pacemaker? [] []   Are you receiving dialysis? [] []   Do you or any blood related relative have a history of malignant hyperthermia? [] []     COLOGUARD / FIT TEST Y N   Has the patient had a recent positive Cologuard or FIT test?  [] []   If YES, physician must be notified via telephone encounter. Proceed with scheduling. GENERAL QUESTIONS Y N   Do you have a history of MRSA? [] []     IF YES, This must be reviewed with physician performing procedure-route to [facility].  MA will then need to order 3-MRSA cultures at [facility]. This is a nasal swab. (Do not schedule)     Do you have a history of C. diff infection? [] []     If YES, is the patient currently on antibiotics for C. diff? [] []    If YES, is the patient currently having diarrhea? [] []   If they answer NO to both questions, proceed with scheduling. If they answer YES, notify provider and do NOT schedule. Do you have a daily bowel movement? [] []     If NO, patient will need Miralax for one week prior to procedure. Do you take any pain medications with codeine derivatives? [] []     If YES, patient will need Miralax for one week prior to procedure. Are you currently taking PHENTERMINE for weight loss or any other diet medications? [] []     If YES, the patient must be off of this medication for 2 weeks prior to the procedure. Do you have any outstanding cardiac orders OR any recently completed or upcoming / scheduled cardiac testing, including an appointment with a cardiologist for new cardiac symptoms? [] []     If YES, send note to [facility]. If this is future testing, do NOT schedule. Do you have any travel plans scheduled outside of the [facility]? [] []     If YES, advise patient, no travel by airplane for two weeks after the procedure. Do you have any of the following:  Y N     High blood pressure [] []    Asthma [] []    Seizures (If YES, do not schedule and route to [facility]) [] []    Other implanted device [] []     If Yes, Type and notify Medical assistant:     Heart Disease [] []      If Yes, Type:      Stents [] []    Sleep Apnea [] []    C-PAP [] []    Latex Allergy [] []    Egg Allergy [] []    Kidney Disease / Kidney Problems [] []    Diabetes [] []    Pregnant (If YES, do not not schedule and route to [facility]) [] []     IF PROCEDURE SCHEDULED WITHIN 14 DAYS Yes No    In the past 14 days, have you had any flu-like symptoms such as fever, chills, body aches, cough, shortness of breath, sore throat, or congestion?  [] []   If YES, patient must postpone scheduling until symptom free for 14 days. Date / Time of procedure: 8/1/2023    Schedule with: Dr. Jorge Lechuga:   [facility], [address] [place] [address] AT [facility], [phone], [address]  [place] [address]  Phone: [phone] Fax: [phone]     COMMENTS:    How do you prefer to receive your paperwork for your upcoming procedure?   [] Awilda Walton      []  Arsh (Texting)      []  Mail           Completed by: Jordy Paredes MA    Date complete: 06/26/23

## 2023-06-27 ENCOUNTER — APPOINTMENT (OUTPATIENT)
Dept: PHYSICAL THERAPY | Age: 43
End: 2023-06-27
Attending: PHYSICAL MEDICINE & REHABILITATION
Payer: COMMERCIAL

## 2023-06-28 ENCOUNTER — TELEPHONE (OUTPATIENT)
Dept: PHYSICAL THERAPY | Age: 43
End: 2023-06-28

## 2023-06-28 ENCOUNTER — APPOINTMENT (OUTPATIENT)
Dept: PHYSICAL THERAPY | Age: 43
End: 2023-06-28
Attending: PHYSICAL MEDICINE & REHABILITATION
Payer: COMMERCIAL

## 2023-06-30 ENCOUNTER — APPOINTMENT (OUTPATIENT)
Dept: PHYSICAL THERAPY | Age: 43
End: 2023-06-30
Attending: PHYSICAL MEDICINE & REHABILITATION
Payer: COMMERCIAL

## 2023-07-03 ENCOUNTER — APPOINTMENT (OUTPATIENT)
Dept: PHYSICAL THERAPY | Age: 43
End: 2023-07-03
Attending: PHYSICAL MEDICINE & REHABILITATION
Payer: COMMERCIAL

## 2023-07-05 ENCOUNTER — APPOINTMENT (OUTPATIENT)
Dept: PHYSICAL THERAPY | Age: 43
End: 2023-07-05
Attending: PHYSICAL MEDICINE & REHABILITATION
Payer: COMMERCIAL

## 2023-07-10 ENCOUNTER — APPOINTMENT (OUTPATIENT)
Dept: PHYSICAL THERAPY | Age: 43
End: 2023-07-10
Attending: PHYSICAL MEDICINE & REHABILITATION
Payer: COMMERCIAL

## 2023-07-11 ENCOUNTER — LAB ENCOUNTER (OUTPATIENT)
Dept: LAB | Age: 43
End: 2023-07-11
Attending: STUDENT IN AN ORGANIZED HEALTH CARE EDUCATION/TRAINING PROGRAM
Payer: COMMERCIAL

## 2023-07-11 DIAGNOSIS — R74.8 ELEVATED LIVER ENZYMES: ICD-10-CM

## 2023-07-11 LAB
ALBUMIN SERPL-MCNC: 3.5 G/DL (ref 3.4–5)
ALBUMIN/GLOB SERPL: 1 {RATIO} (ref 1–2)
ALP LIVER SERPL-CCNC: 62 U/L
ALT SERPL-CCNC: 34 U/L
ANION GAP SERPL CALC-SCNC: 6 MMOL/L (ref 0–18)
AST SERPL-CCNC: 19 U/L (ref 15–37)
BILIRUB SERPL-MCNC: 0.2 MG/DL (ref 0.1–2)
BUN BLD-MCNC: 14 MG/DL (ref 7–18)
CALCIUM BLD-MCNC: 9.3 MG/DL (ref 8.5–10.1)
CHLORIDE SERPL-SCNC: 110 MMOL/L (ref 98–112)
CO2 SERPL-SCNC: 21 MMOL/L (ref 21–32)
CREAT BLD-MCNC: 0.8 MG/DL
FASTING STATUS PATIENT QL REPORTED: NO
GFR SERPLBLD BASED ON 1.73 SQ M-ARVRAT: 95 ML/MIN/1.73M2 (ref 60–?)
GLOBULIN PLAS-MCNC: 3.4 G/DL (ref 2.8–4.4)
GLUCOSE BLD-MCNC: 111 MG/DL (ref 70–99)
HAV IGM SER QL: NONREACTIVE
HBV CORE AB SERPL QL IA: NONREACTIVE
HBV CORE IGM SER QL: NONREACTIVE
HBV SURFACE AG SERPL QL IA: NONREACTIVE
HCV AB SERPL QL IA: NONREACTIVE
IMMUNOGLOBULIN PNL SER-MCNC: 724 MG/DL (ref 791–1643)
OSMOLALITY SERPL CALC.SUM OF ELEC: 285 MOSM/KG (ref 275–295)
POTASSIUM SERPL-SCNC: 3.8 MMOL/L (ref 3.5–5.1)
PROT SERPL-MCNC: 6.9 G/DL (ref 6.4–8.2)
SODIUM SERPL-SCNC: 137 MMOL/L (ref 136–145)

## 2023-07-11 PROCEDURE — 80074 ACUTE HEPATITIS PANEL: CPT

## 2023-07-11 PROCEDURE — 80053 COMPREHEN METABOLIC PANEL: CPT

## 2023-07-11 PROCEDURE — 86704 HEP B CORE ANTIBODY TOTAL: CPT

## 2023-07-11 PROCEDURE — 83516 IMMUNOASSAY NONANTIBODY: CPT

## 2023-07-11 PROCEDURE — 82784 ASSAY IGA/IGD/IGG/IGM EACH: CPT

## 2023-07-12 ENCOUNTER — APPOINTMENT (OUTPATIENT)
Dept: PHYSICAL THERAPY | Age: 43
End: 2023-07-12
Attending: PHYSICAL MEDICINE & REHABILITATION
Payer: COMMERCIAL

## 2023-07-12 LAB
ACTIN SMOOTH MUSCLE AB: 3 UNITS
M2 MITOCHONDRIAL AB: <20 UNITS

## 2023-07-17 ENCOUNTER — APPOINTMENT (OUTPATIENT)
Dept: PHYSICAL THERAPY | Age: 43
End: 2023-07-17
Attending: PHYSICAL MEDICINE & REHABILITATION
Payer: COMMERCIAL

## 2023-07-19 ENCOUNTER — OFFICE VISIT (OUTPATIENT)
Dept: NEUROLOGY | Facility: CLINIC | Age: 43
End: 2023-07-19
Payer: COMMERCIAL

## 2023-07-19 VITALS
RESPIRATION RATE: 16 BRPM | WEIGHT: 173 LBS | DIASTOLIC BLOOD PRESSURE: 72 MMHG | BODY MASS INDEX: 28 KG/M2 | HEART RATE: 90 BPM | SYSTOLIC BLOOD PRESSURE: 124 MMHG

## 2023-07-19 DIAGNOSIS — G44.86 CERVICOGENIC HEADACHE: ICD-10-CM

## 2023-07-19 DIAGNOSIS — R51.9 LEFT FACIAL PAIN: ICD-10-CM

## 2023-07-19 DIAGNOSIS — R42 DIZZINESS: ICD-10-CM

## 2023-07-19 PROCEDURE — 3074F SYST BP LT 130 MM HG: CPT | Performed by: OTHER

## 2023-07-19 PROCEDURE — 3078F DIAST BP <80 MM HG: CPT | Performed by: OTHER

## 2023-07-19 PROCEDURE — 99244 OFF/OP CNSLTJ NEW/EST MOD 40: CPT | Performed by: OTHER

## 2023-07-19 RX ORDER — DULOXETIN HYDROCHLORIDE 20 MG/1
20 CAPSULE, DELAYED RELEASE ORAL DAILY
Qty: 30 CAPSULE | Refills: 5 | Status: SHIPPED | OUTPATIENT
Start: 2023-07-19

## 2023-07-19 NOTE — PROGRESS NOTES
Patient was seen in the ED on 06/23/2023 for dizziness sensation that lasted about 4-5 days. Patient states dizziness is ongoing, however no as persistent. Patient states difficulty with speech. Patient states internal shaking sensation. Patient states facial flushing sensation on the left side of the face. Patient states left sided sensation on the neck. Patient states mental \"spacey\". Denies head trauma. Increase in HA.

## 2023-07-19 NOTE — PROGRESS NOTES
HPI:    Patient ID: Lucina Grant is a 40year old female. HPI  Aliya Dawn is a 45-year-old female who presented for evaluation of dizziness. Patient reports she was seen in the [facility] 06/23/2023 for dizziness sensation that lasted about 4-5 days. Patient states dizziness is ongoing, however no as persistent. She reports that she feels dizzy when she is laying down if her brain is moving from side to side. States for started about a year ago and had seen ENT in the past and was diagnosed with vertigo. States since then she has been to the ED 3 times. .Patient c/o pain on the left sides of the neck going down to the shoulders. Sometime has pain around the left eye. She furthers states internal shaking sensation. Patient states facial flushing sensation on the left side of the face. Patient states left sided sensation on the neck. Stress and heighten emotions triggers the symptoms. CT head done in the ED was negative. Had seen Dr Santiago [facility] and was diagnosed with  possible occipital neuralgia and migraine variant.   Had MRI of the brain and MRI of the cervical spine done which was negative for any acute abnormality in addition she had a cardiac work-up done which was also unremarkable she reports that the recent carotid ultrasound from April showed bilateral stenosis 50-70% and was advised to see vascular surgery    HISTORY:  Past Medical History:   Diagnosis Date    Abdominal pain     Anxiety     Arthritis     Atypical mole     Back pain     Bad breath     Belching     Bloating     Body piercing     Change in hair     Chest pain     Chronic cough     Decorative tattoo     Diarrhea, unspecified     Dizziness     Factor V Leiden ([address]     Fatigue     Hearing loss     Heartburn     Hoarseness, chronic     Indigestion     Leaking of urine     Leg swelling     Pain in joints     Problems with swallowing     Skin blushing/flushing     Sputum production     Stress     Wheezing       Past Surgical History:   Procedure Laterality Date    BREAST SURGERY      COLPOSCOPY,BX CERVIX/ENDOCERV CURR      multiple    D & C      EGD      EXCISIONAL BIOPSY LEFT  04/2021    Fibroadenoma    FRACTURE SURGERY      R. leg    IMPLANT LEFT  2005    Saline    IMPLANT RIGHT  2005    Saline    OTHER SURGICAL HISTORY      Breast Augmentation      Family History   Problem Relation Age of Onset    Diabetes Father     Colon Polyps Father     Hypertension Mother     Diabetes Mother     Alcohol and Other Disorders Associated Mother     Ulcerative Colitis Mother     Breast Cancer Maternal Grandmother 50        dx age 50    Breast Cancer Paternal Aunt         unknown      Social History     Socioeconomic History    Marital status: Single   Tobacco Use    Smoking status: Every Day     Packs/day: 1.00     Types: Cigarettes    Smokeless tobacco: Never   Vaping Use    Vaping Use: Never used   Substance and Sexual Activity    Alcohol use: Never    Drug use: Never   Other Topics Concern    Caffeine Concern Yes     Comment: coffee 1 cups        Review of Systems   Constitutional: Negative. HENT: Negative. Eyes: Negative. Respiratory: Negative. Cardiovascular: Negative. Gastrointestinal: Negative. Endocrine: Negative. Genitourinary: Negative. Musculoskeletal:  Positive for neck stiffness. Skin: Negative. Allergic/Immunologic: Negative. Neurological:  Positive for dizziness and headaches. Hematological: Negative. Psychiatric/Behavioral: Negative. All other systems reviewed and are negative. Current Outpatient Medications   Medication Sig Dispense Refill    omeprazole 2 mg/mL Oral Suspension Take 10 mL (20 mg total) by mouth.      pantoprazole 40 MG Oral Tab EC Take one tablet (40 mg total) by mouth once daily, 30 minutes prior to breakfast. 90 tablet 0    diazePAM 5 MG Oral Tab Take 1 tablet (5 mg total) by mouth 3 (three) times daily as needed (dizziness).  Do not drive or operate machinery within 8 hours of taking this medication 20 tablet 0    meclizine 25 MG Oral Tab Take 1 tablet (25 mg total) by mouth 3 (three) times daily as needed for Dizziness. 30 tablet 0    aspirin 81 MG Oral Tab EC Take 1 tablet (81 mg total) by mouth daily. albuterol 108 (90 Base) MCG/ACT Inhalation Aero Soln Inhale 2 puffs into the lungs every 4 (four) hours. Allergies:  Codeine                 NAUSEA ONLY, NAUSEA AND VOMITING  PHYSICAL EXAM:   Physical Exam    Blood pressure 124/72, pulse 90, resp. rate 16, weight 173 lb (78.5 kg). General Appearance: Well nourished, well developed, no apparent distress. HEENT: Normocephalic and atraumatic. Normal sclera. Moist mucus membrane  Neck: Normal range of motion. Neck supple. Cardiovascular: Normal rate, regular rhythm and normal heart sounds. Pulmonary/Chest: Effort normal and breath sounds normal.   Abdominal: Soft. Bowel sounds are normal.   Skin: dry, clean and intact  Ext: peripheral pulses present  Psych: normal mood and affect    Neurological:  Patient is awake, alert and oriented to person, place and time   Normal memory, attention/concentration, speech and language. Cranial Nerves: II: Visual acuity: normal  II: Visual fields: normal  III: Pupils: equal, round, reactive to light  III,IV,VI: Extra Ocular Movements: intact  V: Facial sensation: intact  VII: Facial strength: intact  VIII: Hearing: intact  IX: Palate: intact  XI: Shoulder shrug: intact  XII: Tongue movement: normal    Motor: Normal tone. Strength is  5 out of 5 in all extremities bilaterally. DTR: present    Sensory: Sensory examination is normal to light touch and pinprick     Coordination: Finger-to-nose, heel-to-shin, and rapid alternating movements are normal bilaterally without evidence of dysmetria.     Gait: Casual, toe, heel, and tandem gait are normal.          TESTS/IMAGING:     Outside records MRI of the cervical spine  Impression:   There are multilevel disc bulges present throughout the cervical spine which results in multiple   levels of mild central canal stenosis. Additionally, there are multiple levels of mild foraminal   stenosis appreciated secondary to underlying uncovertebral joint hypertrophy. Please see further   details in the body the report. Straightening of the upper cervical lordosis may indicate underlying cervical spasm. MRI of the brain    Findings: There are no intra-axial or extra-axial fluid collections identified on this exam. No midline shift   or mass effect is present. The ventricles, cisterns and sulci appear normal in size and configuration for patient age. Gray/   white matter differentiation appears well preserved. The midline structures appear well formed. The craniocervical junction appears unremarkable. Major   intracranial flow-voids are well maintained. There is no restricted diffusion identified to suggest an acute ischemic event. Visualized orbits, paranasal sinuses and mastoid air cells are clear. Impression:         Normal noncontrast MRI of the brain. ASSESSMENT/PLAN:     (R42) Dizziness  Plan: EEG      (R51.9) Left facial pain      (G44.86) Cervicogenic headache      Dizziness unspecified  History of vertigo in the past  Reviewed outside records    MRI of the brain and MRI of the cervical spine done at [facility] in Dec 2022 rule out any demyelinating disease process    Advised to do an EEG given unexplained dizziness and brain fog    2. Left neck/lower facial pain and left-sided headache ? Cervicogenic headache versus migraine variant  Has e/o multilevel cervical spondylitic changes on the MRI of the cervical spine  Continue conservative management    3.   Abnormal ultrasound carotid from April 2023  Explained to the patient that she had a CTA head and neck performed here at [facility] in January 2023 which showed patent carotid arteries and no evidence of any hemodynamically significant stenosis  Report was printed and given to the patient. Advised to obtain CT imaging reviewed with vascular surgery      Follow-up as needed      Thank you for allowing us to participate in your patient's care. MD Trae Cordero      This note was prepared using Ping[employer] voice recognition dictation software. As a result errors may occur. When identified these errors have been corrected.  While every attempt is made to correct errors during dictation discrepancies may still exist         Meds This Visit:  Requested Prescriptions      No prescriptions requested or ordered in this encounter       Imaging & Referrals:  None     OQ#[id number]

## 2023-07-21 ENCOUNTER — HOSPITAL ENCOUNTER (OUTPATIENT)
Dept: ULTRASOUND IMAGING | Age: 43
Discharge: HOME | End: 2023-07-21
Attending: STUDENT IN AN ORGANIZED HEALTH CARE EDUCATION/TRAINING PROGRAM
Payer: COMMERCIAL

## 2023-07-21 DIAGNOSIS — R74.8 ELEVATED LIVER ENZYMES: ICD-10-CM

## 2023-07-21 PROCEDURE — 76700 US EXAM ABDOM COMPLETE: CPT | Performed by: STUDENT IN AN ORGANIZED HEALTH CARE EDUCATION/TRAINING PROGRAM

## 2023-07-22 ENCOUNTER — HOSPITAL ENCOUNTER (EMERGENCY)
Age: 43
Discharge: HOME | End: 2023-07-22
Attending: EMERGENCY MEDICINE
Payer: COMMERCIAL

## 2023-07-22 ENCOUNTER — APPOINTMENT (OUTPATIENT)
Dept: GENERAL RADIOLOGY | Age: 43
End: 2023-07-22
Attending: EMERGENCY MEDICINE
Payer: COMMERCIAL

## 2023-07-22 VITALS
WEIGHT: 175 LBS | HEIGHT: 66 IN | DIASTOLIC BLOOD PRESSURE: 68 MMHG | OXYGEN SATURATION: 98 % | HEART RATE: 97 BPM | TEMPERATURE: 99 F | BODY MASS INDEX: 28.13 KG/M2 | RESPIRATION RATE: 16 BRPM | SYSTOLIC BLOOD PRESSURE: 124 MMHG

## 2023-07-22 DIAGNOSIS — R00.2 PALPITATIONS: Primary | ICD-10-CM

## 2023-07-22 LAB
ALBUMIN SERPL-MCNC: 3.5 G/DL (ref 3.4–5)
ALBUMIN/GLOB SERPL: 1 {RATIO} (ref 1–2)
ALP LIVER SERPL-CCNC: 75 U/L
ALT SERPL-CCNC: 38 U/L
ANION GAP SERPL CALC-SCNC: 4 MMOL/L (ref 0–18)
AST SERPL-CCNC: 22 U/L (ref 15–37)
BASOPHILS # BLD AUTO: 0.02 X10(3) UL (ref 0–0.2)
BASOPHILS NFR BLD AUTO: 0.2 %
BILIRUB SERPL-MCNC: 0.3 MG/DL (ref 0.1–2)
BUN BLD-MCNC: 12 MG/DL (ref 7–18)
CALCIUM BLD-MCNC: 8.7 MG/DL (ref 8.5–10.1)
CHLORIDE SERPL-SCNC: 108 MMOL/L (ref 98–112)
CO2 SERPL-SCNC: 24 MMOL/L (ref 21–32)
CREAT BLD-MCNC: 0.79 MG/DL
D DIMER PPP FEU-MCNC: 0.33 UG/ML FEU (ref ?–0.5)
EGFRCR SERPLBLD CKD-EPI 2021: 96 ML/MIN/1.73M2 (ref 60–?)
EOSINOPHIL # BLD AUTO: 0.13 X10(3) UL (ref 0–0.7)
EOSINOPHIL NFR BLD AUTO: 1.2 %
ERYTHROCYTE [DISTWIDTH] IN BLOOD BY AUTOMATED COUNT: 13.1 %
GLOBULIN PLAS-MCNC: 3.5 G/DL (ref 2.8–4.4)
GLUCOSE BLD-MCNC: 118 MG/DL (ref 70–99)
HCT VFR BLD AUTO: 41.6 %
HGB BLD-MCNC: 14.3 G/DL
IMM GRANULOCYTES # BLD AUTO: 0.04 X10(3) UL (ref 0–1)
IMM GRANULOCYTES NFR BLD: 0.4 %
LYMPHOCYTES # BLD AUTO: 3.3 X10(3) UL (ref 1–4)
LYMPHOCYTES NFR BLD AUTO: 31.3 %
MCH RBC QN AUTO: 31.8 PG (ref 26–34)
MCHC RBC AUTO-ENTMCNC: 34.4 G/DL (ref 31–37)
MCV RBC AUTO: 92.7 FL
MONOCYTES # BLD AUTO: 0.84 X10(3) UL (ref 0.1–1)
MONOCYTES NFR BLD AUTO: 8 %
NEUTROPHILS # BLD AUTO: 6.23 X10 (3) UL (ref 1.5–7.7)
NEUTROPHILS # BLD AUTO: 6.23 X10(3) UL (ref 1.5–7.7)
NEUTROPHILS NFR BLD AUTO: 58.9 %
OSMOLALITY SERPL CALC.SUM OF ELEC: 283 MOSM/KG (ref 275–295)
PLATELET # BLD AUTO: 302 10(3)UL (ref 150–450)
POTASSIUM SERPL-SCNC: 3.5 MMOL/L (ref 3.5–5.1)
PROT SERPL-MCNC: 7 G/DL (ref 6.4–8.2)
RBC # BLD AUTO: 4.49 X10(6)UL
SARS-COV-2 RNA RESP QL NAA+PROBE: NOT DETECTED
SODIUM SERPL-SCNC: 136 MMOL/L (ref 136–145)
TROPONIN I HIGH SENSITIVITY: <3 NG/L
WBC # BLD AUTO: 10.6 X10(3) UL (ref 4–11)

## 2023-07-22 PROCEDURE — 85379 FIBRIN DEGRADATION QUANT: CPT | Performed by: EMERGENCY MEDICINE

## 2023-07-22 PROCEDURE — 99285 EMERGENCY DEPT VISIT HI MDM: CPT

## 2023-07-22 PROCEDURE — 71045 X-RAY EXAM CHEST 1 VIEW: CPT | Performed by: EMERGENCY MEDICINE

## 2023-07-22 PROCEDURE — 93005 ELECTROCARDIOGRAM TRACING: CPT

## 2023-07-22 PROCEDURE — 85025 COMPLETE CBC W/AUTO DIFF WBC: CPT | Performed by: EMERGENCY MEDICINE

## 2023-07-22 PROCEDURE — 84484 ASSAY OF TROPONIN QUANT: CPT | Performed by: EMERGENCY MEDICINE

## 2023-07-22 PROCEDURE — 36415 COLL VENOUS BLD VENIPUNCTURE: CPT

## 2023-07-22 PROCEDURE — 80053 COMPREHEN METABOLIC PANEL: CPT | Performed by: EMERGENCY MEDICINE

## 2023-07-22 PROCEDURE — 99284 EMERGENCY DEPT VISIT MOD MDM: CPT

## 2023-07-22 PROCEDURE — 93010 ELECTROCARDIOGRAM REPORT: CPT

## 2023-07-22 NOTE — ED INITIAL ASSESSMENT (HPI)
Pt to ed with c/o shaking and left sided neck pain and bounding heart, pt states she has had these episodes before but today it's worse. Today with pain with deep inspiration. Denies dizziness or CO , pt reports finishing  up abx for  r/o ear infection , pt with questionable asthma, took inhaler this AM, no relief with inhaler.

## 2023-07-23 LAB
ATRIAL RATE: 101 BPM
P AXIS: 75 DEGREES
P-R INTERVAL: 128 MS
Q-T INTERVAL: 326 MS
QRS DURATION: 90 MS
QTC CALCULATION (BEZET): 422 MS
R AXIS: 53 DEGREES
T AXIS: 45 DEGREES
VENTRICULAR RATE: 101 BPM

## 2023-09-19 ENCOUNTER — TELEPHONE (OUTPATIENT)
Dept: PHYSICAL MEDICINE AND REHAB | Facility: CLINIC | Age: 43
End: 2023-09-19

## 2023-09-19 DIAGNOSIS — M54.9 UPPER BACK PAIN: Primary | ICD-10-CM

## 2023-09-19 DIAGNOSIS — G89.29 CHRONIC SCAPULAR PAIN: ICD-10-CM

## 2023-09-19 DIAGNOSIS — M89.8X1 CHRONIC SCAPULAR PAIN: ICD-10-CM

## 2023-09-19 DIAGNOSIS — M54.2 NECK PAIN: ICD-10-CM

## 2023-09-19 NOTE — TELEPHONE ENCOUNTER
S/w patient, states she started PT and had 2 sessions and had to cancel due to coming down with Covid.  She would like a new PT order, she will f/u after completing PT

## 2023-09-21 ENCOUNTER — TELEPHONE (OUTPATIENT)
Dept: PHYSICAL THERAPY | Facility: HOSPITAL | Age: 43
End: 2023-09-21

## 2023-09-26 ENCOUNTER — OFFICE VISIT (OUTPATIENT)
Dept: PHYSICAL THERAPY | Age: 43
End: 2023-09-26
Attending: PHYSICAL MEDICINE & REHABILITATION
Payer: COMMERCIAL

## 2023-09-26 DIAGNOSIS — M89.8X1 CHRONIC SCAPULAR PAIN: ICD-10-CM

## 2023-09-26 DIAGNOSIS — G89.29 CHRONIC SCAPULAR PAIN: ICD-10-CM

## 2023-09-26 DIAGNOSIS — M54.9 UPPER BACK PAIN: Primary | ICD-10-CM

## 2023-09-26 DIAGNOSIS — M54.2 NECK PAIN: ICD-10-CM

## 2023-09-26 PROCEDURE — 97110 THERAPEUTIC EXERCISES: CPT

## 2023-09-26 PROCEDURE — 97162 PT EVAL MOD COMPLEX 30 MIN: CPT

## 2023-09-29 ENCOUNTER — OFFICE VISIT (OUTPATIENT)
Dept: PHYSICAL THERAPY | Age: 43
End: 2023-09-29
Attending: PHYSICAL MEDICINE & REHABILITATION
Payer: COMMERCIAL

## 2023-09-29 PROCEDURE — 97140 MANUAL THERAPY 1/> REGIONS: CPT

## 2023-09-29 PROCEDURE — 97110 THERAPEUTIC EXERCISES: CPT

## 2023-09-29 NOTE — PROGRESS NOTES
Dx: Cervical Instability         Authorized # of Visits:  12 (PPO)         Next MD visit: none scheduled  Fall Risk: standard         Precautions: n/a          Goal Number: 12    Subjective: States that the neck pain is high. Has not taken her ibuprofen yet today. Objective: Treatment initiated per treatment log. Date:9/29/2023 TX#: 2/12 Date:               TX#: 3/   Date:               TX#: 4/ Date:               TX#: 5/ Date:               TX#: 6/ Date:               TX#: 7/ Date:               TX#: 8/   VAS 5/10 VAS /10 VAS /10 VAS /10 VAS /10 VAS /10 VAS /10   TherEx (25 Min)         UBE 6 min 3/3         Supine Rows and diagonals green x 20          Pec stretch 30 sec x 3         TRX Stretch 10 sec x 10         Scapular \"w's\"         Cable Column 36# x20         Supine nods x 10         Manual PT (15 Min)         OA posterior glide; Upper trapezius stretch          Neuro Edelmira (5 min)         Rhythmic Stabilization x 10 reps in neutral and 45 degree rotation bilaterally. Assessment: The patient responded well to today's intervention. Was able to replicate HEP issued during initial exam.        Goals (12 visits):    1. Increase NDIassessment > 11% from INE to DC. 2. Patient will be aware of postural limitations and be able to correct them independently. 3. Patient will have an increase in spinal mobility to >75% in order to return to sustained posturing and looking over her shoulder. .    4. Patient will have an increase in core strength to assist with returning to sustained posturing and exercise at a local health and wellness facility. 5. Patient will demonstrate an increase in MLT of the upper trapezius in order to return to sustained posturing and exercise. Plan: Assess response to today's treatment and progress as tolerated. Charges: TherEx 2 (25 min);  Manual PT 1 (15 min)      Total Timed Treatment: 40 min  Total Treatment Time: 40 min

## 2023-10-02 ENCOUNTER — OFFICE VISIT (OUTPATIENT)
Dept: PHYSICAL THERAPY | Age: 43
End: 2023-10-02
Attending: PHYSICAL MEDICINE & REHABILITATION
Payer: COMMERCIAL

## 2023-10-02 PROCEDURE — 97110 THERAPEUTIC EXERCISES: CPT

## 2023-10-02 PROCEDURE — 97140 MANUAL THERAPY 1/> REGIONS: CPT

## 2023-10-02 NOTE — PROGRESS NOTES
Dx: Cervical Instability         Authorized # of Visits:  12 (PPO)         Next MD visit: none scheduled  Fall Risk: standard         Precautions: n/a          Goal Number: 12    Subjective: States that the neck pain is doing better. Objective: Treatment progressed per treatment log. Date:9/29/2023 TX#: 2/12 Date:10/2/2023  TX#: 3/   Date:               TX#: 4/ Date:               TX#: 5/ Date:               TX#: 6/ Date:               TX#: 7/ Date:               TX#: 8/   VAS 5/10 VAS /10 VAS /10 VAS /10 VAS /10 VAS /10 VAS /10   TherEx (25 Min) TherEx (25 Min)        UBE 6 min 3/3 UBE 6 min 3/3        Supine Rows and diagonals green x 20  Supine Rows and diagonals green x 20         Pec stretch 30 sec x 3 Pec stretch 30 sec x 3        TRX Stretch 10 sec x 10 Scapular \"w's\" red x 20        Scapular \"w's\" Supine nods x 10        Cable Column 36# x20         Supine nods x 10         Manual PT (15 Min) Manual PT (15 Min)        OA posterior glide; Upper trapezius stretch  OA posterior glide; Upper trapezius stretch         Neuro Edelmira (5 min) Neuro Edelmira (5 min)        Rhythmic Stabilization x 10 reps in neutral and 45 degree rotation bilaterally. Rhythmic Stabilization x 10 reps in neutral and 45 degree rotation bilaterally. Assessment: The patient responded well to today's intervention. Left lower cervical mobility improved with downglide manipulation. Goals (12 visits):    1. Increase NDI assessment > 11% from INE to DC. 2. Patient will be aware of postural limitations and be able to correct them independently. 3. Patient will have an increase in spinal mobility to >75% in order to return to sustained posturing and looking over her shoulder. .    4. Patient will have an increase in core strength to assist with returning to sustained posturing and exercise at a local health and wellness facility.     5. Patient will demonstrate an increase in MLT of the upper trapezius in order to return to sustained posturing and exercise. Plan: Assess response to today's treatment and progress as tolerated. Charges: TherEx 2 (25 min);  Manual PT 1 (15 min)      Total Timed Treatment: 40 min  Total Treatment Time: 40 min

## 2023-10-09 ENCOUNTER — TELEPHONE (OUTPATIENT)
Dept: PHYSICAL THERAPY | Facility: HOSPITAL | Age: 43
End: 2023-10-09

## 2023-10-10 ENCOUNTER — APPOINTMENT (OUTPATIENT)
Dept: PHYSICAL THERAPY | Age: 43
End: 2023-10-10
Attending: PHYSICAL MEDICINE & REHABILITATION
Payer: COMMERCIAL

## 2023-10-10 ENCOUNTER — NURSE ONLY (OUTPATIENT)
Dept: ELECTROPHYSIOLOGY | Facility: HOSPITAL | Age: 43
End: 2023-10-10
Attending: Other
Payer: COMMERCIAL

## 2023-10-10 DIAGNOSIS — R42 DIZZINESS: ICD-10-CM

## 2023-10-10 PROCEDURE — 95816 EEG AWAKE AND DROWSY: CPT

## 2023-10-13 ENCOUNTER — OFFICE VISIT (OUTPATIENT)
Dept: PHYSICAL THERAPY | Age: 43
End: 2023-10-13
Attending: PHYSICAL MEDICINE & REHABILITATION
Payer: COMMERCIAL

## 2023-10-13 PROCEDURE — 97110 THERAPEUTIC EXERCISES: CPT

## 2023-10-13 PROCEDURE — 97140 MANUAL THERAPY 1/> REGIONS: CPT

## 2023-10-13 NOTE — PROGRESS NOTES
Dx: Cervical Instability         Authorized # of Visits:  12 (PPO)         Next MD visit: none scheduled  Fall Risk: standard         Precautions: n/a          Goal Number: 12    Subjective: States that she continues to have some left sided rib and thoracic pain anteriorly. States that she is maintaining her HEP at home. Objective: Treatment progressed per treatment log. Date:9/29/2023 TX#: 2/12 Date:10/2/2023  TX#: 3/   Date:10/13/2023  TX#: 4/ Date:               TX#: 5/ Date:               TX#: 6/ Date:               TX#: 7/ Date:               TX#: 8/   VAS 5/10 VAS /10 VAS 6/10 VAS /10 VAS /10 VAS /10 VAS /10   TherEx (25 Min) TherEx (25 Min) TherEx (25 Min)       UBE 6 min 3/3 UBE 6 min 3/3 UBE 6 min 3/3       Supine Rows and diagonals green x 20  Supine Rows and diagonals green x 20  Supine Rows and diagonals green x 20        Pec stretch 30 sec x 3 Pec stretch 30 sec x 3 Pec stretch 30 sec x 3       TRX Stretch 10 sec x 10 Scapular \"w's\" red x 20 Scapular \"w's\" red x 20       Scapular \"w's\" Supine nods x 10 Supine nods x 10       Cable Column 36# x20  Rhythmic stabilization x 10 reps in neutral and bilateral rotation at 45 degrees. Supine nods x 10         Manual PT (15 Min) Manual PT (15 Min) Manual PT (15 Min)       OA posterior glide; Upper trapezius stretch  OA posterior glide; Upper trapezius stretch  OA posterior glide; Upper trapezius stretch        Neuro Edelmira (5 min) Neuro Edelmira (5 min) Thoracic PA manipulation T3T4 to T7T8; Left rib PA manipulation. Rhythmic Stabilization x 10 reps in neutral and 45 degree rotation bilaterally. Rhythmic Stabilization x 10 reps in neutral and 45 degree rotation bilaterally. Assessment: The patient responded well to today's intervention. Increased tolerance for exercise and understanding for the differentiation of middle and lower trap isolation from upper trapezius. Goals (12 visits):    1.  Increase NDI assessment > 11% from INE to DC. 2. Patient will be aware of postural limitations and be able to correct them independently. 3. Patient will have an increase in spinal mobility to >75% in order to return to sustained posturing and looking over her shoulder. .    4. Patient will have an increase in core strength to assist with returning to sustained posturing and exercise at a local health and wellness facility. 5. Patient will demonstrate an increase in MLT of the upper trapezius in order to return to sustained posturing and exercise. Plan: Assess response to today's treatment and progress as tolerated. Charges: TherEx 2 (25 min);  Manual PT 1 (15 min)      Total Timed Treatment: 40 min  Total Treatment Time: 40 min

## 2023-10-16 ENCOUNTER — OFFICE VISIT (OUTPATIENT)
Dept: PHYSICAL THERAPY | Age: 43
End: 2023-10-16
Attending: PHYSICAL MEDICINE & REHABILITATION
Payer: COMMERCIAL

## 2023-10-16 PROCEDURE — 97110 THERAPEUTIC EXERCISES: CPT

## 2023-10-16 PROCEDURE — 97140 MANUAL THERAPY 1/> REGIONS: CPT

## 2023-10-16 NOTE — PROGRESS NOTES
Dx: Cervical Instability         Authorized # of Visits:  12 (PPO)         Next MD visit: none scheduled  Fall Risk: standard         Precautions: n/a          Goal Number: 12    Subjective: States that she continues to have some left sided cervical pain when her head is turned to a certain level with a certain degree of side bending. Objective: Treatment progressed per treatment log. Date:9/29/2023 TX#: 2/12 Date:10/2/2023  TX#: 3/   Date:10/13/2023  TX#: 4/ Date:10/16/2023  TX#: 5/ Date:  TX#: 6/ Date:              TX#: 7/ Date:               TX#: 8/   VAS 5/10 VAS /10 VAS 6/10 VAS /10 VAS /10 VAS /10 VAS /10   TherEx (25 Min) TherEx (25 Min) TherEx (25 Min) TherEx (25 Min)      UBE 6 min 3/3 UBE 6 min 3/3 UBE 6 min 3/3 UBE 6 min 3/3      Supine Rows and diagonals green x 20  Supine Rows and diagonals green x 20  Supine Rows and diagonals green x 20  SCOM stretch 30 sec x 3 (to HEP)      Pec stretch 30 sec x 3 Pec stretch 30 sec x 3 Pec stretch 30 sec x 3 Pec stretch 30 sec x 3      TRX Stretch 10 sec x 10 Scapular \"w's\" red x 20 Scapular \"w's\" red x 20       Scapular \"w's\" Supine nods x 10 Supine nods x 10       Cable Column 36# x20  Rhythmic stabilization x 10 reps in neutral and bilateral rotation at 45 degrees. Supine nods x 10         Manual PT (15 Min) Manual PT (15 Min) Manual PT (15 Min) Manual PT (15 Min)      OA posterior glide; Upper trapezius stretch  OA posterior glide; Upper trapezius stretch  OA posterior glide; Upper trapezius stretch  OA posterior glide; Upper trapezius stretch       Neuro Edelmira (5 min) Neuro Edelmira (5 min) Thoracic PA manipulation T3T4 to T7T8; Left rib PA manipulation. DTM to the scom x 10 min for medial and lateral glide        Rhythmic Stabilization x 10 reps in neutral and 45 degree rotation bilaterally. Rhythmic Stabilization x 10 reps in neutral and 45 degree rotation bilaterally. Assessment: The patient responded well to today's intervention. Increased SCOM mobility after manual PT. Goals (12 visits):    1. Increase NDI assessment > 11% from INE to DC. 2. Patient will be aware of postural limitations and be able to correct them independently. 3. Patient will have an increase in spinal mobility to >75% in order to return to sustained posturing and looking over her shoulder. .    4. Patient will have an increase in core strength to assist with returning to sustained posturing and exercise at a local health and wellness facility. 5. Patient will demonstrate an increase in MLT of the upper trapezius in order to return to sustained posturing and exercise. Plan: Assess response to today's treatment and progress as tolerated. Charges: TherEx 2 (25 min);  Manual PT 1 (15 min)      Total Timed Treatment: 40 min  Total Treatment Time: 40 min

## 2023-10-17 ENCOUNTER — TELEPHONE (OUTPATIENT)
Dept: SURGERY | Facility: CLINIC | Age: 43
End: 2023-10-17

## 2023-10-17 NOTE — TELEPHONE ENCOUNTER
Pt is requesting the imaging Sofy Slight ordered 3/2/23 be released so PT can review them. Pt states she is having a lot of pain today, but is functional. Please advise, Pt's best call back number is [phone]. Endorsed to RN for Provider.

## 2023-10-17 NOTE — TELEPHONE ENCOUNTER
Noted the patient message listed below. Noted the patient is active on [employer]. Sent the pt a [employer].

## 2023-10-18 ENCOUNTER — OFFICE VISIT (OUTPATIENT)
Dept: PHYSICAL THERAPY | Age: 43
End: 2023-10-18
Attending: PHYSICAL MEDICINE & REHABILITATION
Payer: COMMERCIAL

## 2023-10-18 ENCOUNTER — TELEPHONE (OUTPATIENT)
Dept: NEUROLOGY | Facility: CLINIC | Age: 43
End: 2023-10-18

## 2023-10-18 PROCEDURE — 97140 MANUAL THERAPY 1/> REGIONS: CPT

## 2023-10-18 PROCEDURE — 97110 THERAPEUTIC EXERCISES: CPT

## 2023-10-18 NOTE — PROGRESS NOTES
Dx: Cervical Instability         Authorized # of Visits:  12 (PPO)         Next MD visit: none scheduled  Fall Risk: standard         Precautions: n/a          Goal Number: 12    Subjective: States that she was doing some sanding of a table and noticed an increase in symptoms. States that she experiences some relief from SCOM deep tissue massage last session. Objective: Treatment progressed per treatment log. Date:9/29/2023 TX#: 2/12 Date:10/2/2023  TX#: 3/   Date:10/13/2023  TX#: 4/ Date:10/16/2023  TX#: 5/ Date:10/18/2023  TX#: 6/ Date:              TX#: 7/ Date:               TX#: 8/   VAS 5/10 VAS /10 VAS 6/10 VAS /10 VAS /10 VAS /10 VAS /10   TherEx (25 Min) TherEx (25 Min) TherEx (25 Min) TherEx (25 Min) TherEx (25 Min)     UBE 6 min 3/3 UBE 6 min 3/3 UBE 6 min 3/3 UBE 6 min 3/3 UBE 6 min 3/3     Supine Rows and diagonals green x 20  Supine Rows and diagonals green x 20  Supine Rows and diagonals green x 20  SCOM stretch 30 sec x 3 (to HEP) SCOM stretch 30 sec x 3 (to HEP)     Pec stretch 30 sec x 3 Pec stretch 30 sec x 3 Pec stretch 30 sec x 3 Pec stretch 30 sec x 3 Pec stretch 30 sec x 3     TRX Stretch 10 sec x 10 Scapular \"w's\" red x 20 Scapular \"w's\" red x 20  Supine Rows and diagonals green x 20     Scapular \"w's\" Supine nods x 10 Supine nods x 10  Scapular \"w's\" red x 20     Cable Column 36# x20  Rhythmic stabilization x 10 reps in neutral and bilateral rotation at 45 degrees. Rhythmic stabilization x 10 reps in neutral and bilateral rotation at 45 degrees. Supine nods x 10         Manual PT (15 Min) Manual PT (15 Min) Manual PT (15 Min) Manual PT (15 Min) Manual PT (15 Min)     OA posterior glide; Upper trapezius stretch  OA posterior glide; Upper trapezius stretch  OA posterior glide; Upper trapezius stretch  OA posterior glide; Upper trapezius stretch  OA posterior glide;  Upper trapezius stretch      Neuro Edelmira (5 min) Neuro Edelmira (5 min) Thoracic PA manipulation T3T4 to T7T8; Left rib PA manipulation. DTM to the scom x 10 min for medial and lateral glide   DTM to the scom x 10 min for medial and lateral glide       Rhythmic Stabilization x 10 reps in neutral and 45 degree rotation bilaterally. Rhythmic Stabilization x 10 reps in neutral and 45 degree rotation bilaterally. Assessment: The patient responded well to today's intervention. Increased SCOM mobility after manual PT. Stability of deep neck flexors is improving. Goals (12 visits):    1. Increase NDI assessment > 11% from INE to DC. 2. Patient will be aware of postural limitations and be able to correct them independently. 3. Patient will have an increase in spinal mobility to >75% in order to return to sustained posturing and looking over her shoulder. .    4. Patient will have an increase in core strength to assist with returning to sustained posturing and exercise at a local health and wellness facility. 5. Patient will demonstrate an increase in MLT of the upper trapezius in order to return to sustained posturing and exercise. Plan: Assess response to today's treatment and progress as tolerated. Charges: TherEx 2 (25 min);  Manual PT 1 (15 min)      Total Timed Treatment: 40 min  Total Treatment Time: 40 min

## 2023-10-19 ENCOUNTER — PROCEDURE VISIT (OUTPATIENT)
Dept: NEUROLOGY | Facility: CLINIC | Age: 43
End: 2023-10-19

## 2023-10-19 DIAGNOSIS — R42 DIZZY SPELLS: Primary | ICD-10-CM

## 2023-10-20 NOTE — TELEPHONE ENCOUNTER
Pt states also mentioned on initial call on the 18th that pt is awaiting clearance from Dr Tanisha Vargas that ok to do upper endoscopy. Pt aware Dr Tanisha Vargas to review EEG once released and that Dr was told results possibly today. Please call pt asap once results read.

## 2023-10-20 NOTE — TELEPHONE ENCOUNTER
Mikayla Padilla MD  [address] [place] [address] Nurse  EEG normal    Spoke with patient and relayed above. She asked if this information could be shared with Dr. Adria Garcia with whom she will be having a procedure. Explained that I will route this message on to Dr. Adria Garcia, but that often there is a clearance letter that we need to fill out. She indicated that Dr. Alaina kang should be sending that.

## 2023-10-20 NOTE — PROCEDURES
ELECTROENCEPHALOGRAM REPORT      Patient Name: Zachary Vines  Chart ID: [id number]  Ordering Physician: Heraclio Carrizales MD Date of Test: 10/10/2023  Referring Physician:   Patient Diagnosis: (R42) Dizzy spells  (primary encounter diagnosis)      HISTORY  PATIENT IS A 42 MALE WHO REPORTS DIZZY SPELLS LASTING UP TO A WEEK; WITH  LEFT SIDED EYE AND NECK PAIN ACCOMPANIEMENT. SOMETIMES SHE FEELS AS IF SHE IS \"INTERNALLY SHAKING. \" NO OTHER CLINICAL CORRELATES. PAST MEDICAL HISTORY:  ANXIETY, FACTOR V LEIDEN    MEDICATIONS:DULOXETINE, DIAZEPAM, MECLIZINE        TECHNICAL SUMMARY  1. 10-20 International System. 2.  Bipolar and monopolar recording. 3.  Routine recording (awake and asleep). 4.  Portable - C.E.S.  5.  Impedance - 10 kilohms or less. A routine EEG was obtained. No sedation was given. BACKGROUND:   Awake:   During wakefulness a well developed, reactive, posterior dominant rhythm consisting of 9-10  hertz potentials of medium amplitude is seen symmetrically. Low voltage beta activity is seen with a frontal predominance. Sleep:   Stage I and II demonstrated. EPILEPTIFORM ABNORMALITIES:  There is no clear epileptiform activity seen in this recording      ACTIVATION PROCEDURES:   Hyperventilation and photic stimulation did not induced any abnormal responses          IMPRESSION:   This is a normal awake and asleep EEG study. No clear epileptiform abnormalities seen. This however does not exclude a seizure disorder. Clinical correlation is advised. Thank you for allowing us to participate in your patient's care.       MD Willie Lizama [facility]

## 2023-10-23 NOTE — TELEPHONE ENCOUNTER
Noted that patient has messaged inquiring about \"medical clearance\". Per Dr. Leandro Rehman in office visit note on 6/21/23:    \"ASSESSMENT:  1. Back pain     Plan:  1.  Continue PT  2 F/U prn\"    Messaged patient asking for additional information regarding her request.

## 2023-10-24 ENCOUNTER — TELEPHONE (OUTPATIENT)
Dept: NEUROLOGY | Facility: CLINIC | Age: 43
End: 2023-10-24

## 2023-10-26 ENCOUNTER — MED REC SCAN ONLY (OUTPATIENT)
Dept: NEUROLOGY | Facility: CLINIC | Age: 43
End: 2023-10-26

## 2023-10-26 NOTE — TELEPHONE ENCOUNTER
Faxed clearance back to [address] [place] [address]. States Neurology clearance approved. Confirmation received.      Sent for scanning on med rec encounter dated  10/26/23

## 2023-10-29 ENCOUNTER — HOSPITAL ENCOUNTER (EMERGENCY)
Facility: HOSPITAL | Age: 43
Discharge: HOME | End: 2023-10-29
Attending: EMERGENCY MEDICINE
Payer: COMMERCIAL

## 2023-10-29 ENCOUNTER — APPOINTMENT (OUTPATIENT)
Dept: CT IMAGING | Facility: HOSPITAL | Age: 43
End: 2023-10-29
Attending: EMERGENCY MEDICINE
Payer: COMMERCIAL

## 2023-10-29 VITALS
DIASTOLIC BLOOD PRESSURE: 86 MMHG | OXYGEN SATURATION: 100 % | BODY MASS INDEX: 28.93 KG/M2 | HEART RATE: 85 BPM | TEMPERATURE: 97 F | WEIGHT: 180 LBS | HEIGHT: 66 IN | RESPIRATION RATE: 16 BRPM | SYSTOLIC BLOOD PRESSURE: 140 MMHG

## 2023-10-29 DIAGNOSIS — R42 VERTIGO: Primary | ICD-10-CM

## 2023-10-29 LAB
ALBUMIN SERPL-MCNC: 3.6 G/DL (ref 3.4–5)
ALBUMIN/GLOB SERPL: 0.9 {RATIO} (ref 1–2)
ALP LIVER SERPL-CCNC: 76 U/L
ALT SERPL-CCNC: 33 U/L
ANION GAP SERPL CALC-SCNC: 4 MMOL/L (ref 0–18)
AST SERPL-CCNC: 18 U/L (ref 15–37)
BASOPHILS # BLD AUTO: 0.06 X10(3) UL (ref 0–0.2)
BASOPHILS NFR BLD AUTO: 0.5 %
BILIRUB SERPL-MCNC: 0.3 MG/DL (ref 0.1–2)
BUN BLD-MCNC: 15 MG/DL (ref 7–18)
CALCIUM BLD-MCNC: 9.1 MG/DL (ref 8.5–10.1)
CHLORIDE SERPL-SCNC: 109 MMOL/L (ref 98–112)
CO2 SERPL-SCNC: 25 MMOL/L (ref 21–32)
CREAT BLD-MCNC: 0.85 MG/DL
EGFRCR SERPLBLD CKD-EPI 2021: 88 ML/MIN/1.73M2 (ref 60–?)
EOSINOPHIL # BLD AUTO: 0.14 X10(3) UL (ref 0–0.7)
EOSINOPHIL NFR BLD AUTO: 1.2 %
ERYTHROCYTE [DISTWIDTH] IN BLOOD BY AUTOMATED COUNT: 11.9 %
GLOBULIN PLAS-MCNC: 3.8 G/DL (ref 2.8–4.4)
GLUCOSE BLD-MCNC: 105 MG/DL (ref 70–99)
HCT VFR BLD AUTO: 40.9 %
HGB BLD-MCNC: 14.1 G/DL
IMM GRANULOCYTES # BLD AUTO: 0.02 X10(3) UL (ref 0–1)
IMM GRANULOCYTES NFR BLD: 0.2 %
LYMPHOCYTES # BLD AUTO: 4.32 X10(3) UL (ref 1–4)
LYMPHOCYTES NFR BLD AUTO: 37.1 %
MCH RBC QN AUTO: 31.4 PG (ref 26–34)
MCHC RBC AUTO-ENTMCNC: 34.5 G/DL (ref 31–37)
MCV RBC AUTO: 91.1 FL
MONOCYTES # BLD AUTO: 0.88 X10(3) UL (ref 0.1–1)
MONOCYTES NFR BLD AUTO: 7.6 %
NEUTROPHILS # BLD AUTO: 6.22 X10 (3) UL (ref 1.5–7.7)
NEUTROPHILS # BLD AUTO: 6.22 X10(3) UL (ref 1.5–7.7)
NEUTROPHILS NFR BLD AUTO: 53.4 %
OSMOLALITY SERPL CALC.SUM OF ELEC: 287 MOSM/KG (ref 275–295)
PLATELET # BLD AUTO: 395 10(3)UL (ref 150–450)
POTASSIUM SERPL-SCNC: 3.7 MMOL/L (ref 3.5–5.1)
PROT SERPL-MCNC: 7.4 G/DL (ref 6.4–8.2)
RBC # BLD AUTO: 4.49 X10(6)UL
SODIUM SERPL-SCNC: 138 MMOL/L (ref 136–145)
TROPONIN I HIGH SENSITIVITY: 4 NG/L
WBC # BLD AUTO: 11.6 X10(3) UL (ref 4–11)

## 2023-10-29 PROCEDURE — 99284 EMERGENCY DEPT VISIT MOD MDM: CPT

## 2023-10-29 PROCEDURE — 36415 COLL VENOUS BLD VENIPUNCTURE: CPT

## 2023-10-29 PROCEDURE — 80053 COMPREHEN METABOLIC PANEL: CPT | Performed by: EMERGENCY MEDICINE

## 2023-10-29 PROCEDURE — 84484 ASSAY OF TROPONIN QUANT: CPT | Performed by: EMERGENCY MEDICINE

## 2023-10-29 PROCEDURE — 70450 CT HEAD/BRAIN W/O DYE: CPT | Performed by: EMERGENCY MEDICINE

## 2023-10-29 PROCEDURE — 93005 ELECTROCARDIOGRAM TRACING: CPT

## 2023-10-29 PROCEDURE — 93010 ELECTROCARDIOGRAM REPORT: CPT

## 2023-10-29 PROCEDURE — 85025 COMPLETE CBC W/AUTO DIFF WBC: CPT | Performed by: EMERGENCY MEDICINE

## 2023-10-29 RX ORDER — DOXYCYCLINE 100 MG/1
100 CAPSULE ORAL 2 TIMES DAILY
COMMUNITY
Start: 2023-10-24

## 2023-10-29 RX ORDER — PREDNISONE 20 MG/1
20 TABLET ORAL DAILY
COMMUNITY
Start: 2023-10-06

## 2023-10-29 NOTE — ED INITIAL ASSESSMENT (HPI)
Pt is awake and alert, has chronic vertigo, was cleared with ear issues by ent, saw cardiologist, on antibiotic from ent after having cough,

## 2023-10-30 LAB
ATRIAL RATE: 74 BPM
P AXIS: 75 DEGREES
P-R INTERVAL: 124 MS
Q-T INTERVAL: 384 MS
QRS DURATION: 92 MS
QTC CALCULATION (BEZET): 426 MS
R AXIS: 39 DEGREES
T AXIS: 40 DEGREES
VENTRICULAR RATE: 74 BPM

## 2023-11-29 ENCOUNTER — TELEPHONE (OUTPATIENT)
Dept: NEUROLOGY | Facility: CLINIC | Age: 43
End: 2023-11-29

## 2023-11-29 NOTE — TELEPHONE ENCOUNTER
Rcvd Electromyogram from [facility]; Visit date 11/20/2023; report placed in nursing bin for  and review

## 2023-12-05 ENCOUNTER — TELEPHONE (OUTPATIENT)
Dept: NEUROLOGY | Facility: CLINIC | Age: 43
End: 2023-12-05

## 2023-12-05 NOTE — TELEPHONE ENCOUNTER
Spoke with pt to update insurance  Pt states she dose have new insurance  Will call back to update ins or bring on date of appointment

## 2023-12-08 ENCOUNTER — TELEPHONE (OUTPATIENT)
Dept: NEUROLOGY | Facility: CLINIC | Age: 43
End: 2023-12-08

## 2023-12-08 ENCOUNTER — PATIENT MESSAGE (OUTPATIENT)
Dept: NEUROLOGY | Facility: CLINIC | Age: 43
End: 2023-12-08

## 2023-12-08 ENCOUNTER — OFFICE VISIT (OUTPATIENT)
Dept: NEUROLOGY | Facility: CLINIC | Age: 43
End: 2023-12-08
Payer: COMMERCIAL

## 2023-12-08 VITALS
RESPIRATION RATE: 16 BRPM | WEIGHT: 187 LBS | SYSTOLIC BLOOD PRESSURE: 128 MMHG | DIASTOLIC BLOOD PRESSURE: 70 MMHG | HEART RATE: 86 BPM | BODY MASS INDEX: 30 KG/M2

## 2023-12-08 DIAGNOSIS — M54.81 OCCIPITAL NEURALGIA OF LEFT SIDE: Primary | ICD-10-CM

## 2023-12-08 DIAGNOSIS — R42 DIZZINESS: ICD-10-CM

## 2023-12-08 DIAGNOSIS — M50.90 CERVICAL DISC DISEASE: Primary | ICD-10-CM

## 2023-12-08 DIAGNOSIS — M54.81 OCCIPITAL NEURALGIA OF LEFT SIDE: ICD-10-CM

## 2023-12-08 PROCEDURE — 3078F DIAST BP <80 MM HG: CPT | Performed by: OTHER

## 2023-12-08 PROCEDURE — 3074F SYST BP LT 130 MM HG: CPT | Performed by: OTHER

## 2023-12-08 PROCEDURE — 99214 OFFICE O/P EST MOD 30 MIN: CPT | Performed by: OTHER

## 2023-12-08 RX ORDER — AMITRIPTYLINE HYDROCHLORIDE 25 MG/1
25 TABLET, FILM COATED ORAL NIGHTLY
Qty: 30 TABLET | Refills: 5 | Status: SHIPPED | OUTPATIENT
Start: 2023-12-08

## 2023-12-08 RX ORDER — CYCLOBENZAPRINE HCL 5 MG
5 TABLET ORAL NIGHTLY PRN
COMMUNITY
Start: 2023-12-01

## 2023-12-08 RX ORDER — GABAPENTIN 300 MG/1
300 CAPSULE ORAL NIGHTLY
Qty: 30 CAPSULE | Refills: 2 | Status: SHIPPED | OUTPATIENT
Start: 2023-12-08

## 2023-12-08 NOTE — TELEPHONE ENCOUNTER
From: Courtney Sahu  To: Gagan Clark  Sent: 12/8/2023 3:55 PM CST  Subject: Neurologist     Was hoping we could try a different medication other than Gabbapentin. I remember taking this a few years ago and didn't work very well. I apologize. I was curious about amitritptyline. I had [facility] put back the [facility]. I tried to ask  when I was leaving they said to just call Please let me know if you can call that one in and I can pick it up soon. Thank you so much for you're time.

## 2023-12-08 NOTE — TELEPHONE ENCOUNTER
Pt calling to state she does not wish to take gabapentin as she recalls took years ago and didn't work well. Pt would like script for amitriptyline. Pt leaves in the morning out of town, would appreciate script called in today.

## 2023-12-08 NOTE — TELEPHONE ENCOUNTER
Verbal order received from Dr Ortiz Siddiqui for Rx Amitriptyline 25mg  nightly( #30 with 5 refills). To take 1/2 tablet nightly for the first 3 days.

## 2023-12-08 NOTE — PROGRESS NOTES
HPI:    Patient ID: Corina Quinn is a 44year old female. HPI  Courtney Wang is a 71-year-old female who presented for follow up and discuss MRI cervical spine results  States continues to have neck pain and shoulder blades and back of head and intermittent pain in the left arm feels pulling sensation when she lift it up and gets some numbness. Had extensive breast implant explantation surgery done and has scars in the left side of chest.     MRI cervical spine done at [facility] shows disc bulging and posterior disc osteophyte abutting the cord from C3-C4 through C5-C6. EMG of the upper extremities done recently shows no evidence of any carpal tunnel ulnar neuropathies and evidence of any cervical radiculopathy or brachial plexopathy     States she is seeing a pain medicine specialist and getting occipital nerve block and cervical epidural injection was suggested    Had another episodes of dizziness and vertigo like,  tilted sensation feels like she is falling associated nausea. Had episodes of vertigo in the past.      Office visit:   Patient reports she was seen in the [facility] 06/23/2023 for dizziness sensation that lasted about 4-5 days. Patient states dizziness is ongoing, however no as persistent. She reports that she feels dizzy when she is laying down if her brain is moving from side to side. States for started about a year ago and had seen ENT in the past and was diagnosed with vertigo. States since then she has been to the ED 3 times. .Patient c/o pain on the left sides of the neck going down to the shoulders. Sometime has pain around the left eye. She furthers states internal shaking sensation. Patient states facial flushing sensation on the left side of the face. Patient states left sided sensation on the neck. Stress and heighten emotions triggers the symptoms. CT head done in the ED was negative.      Had seen Dr Olivares [facility] and was diagnosed with  possible occipital neuralgia and migraine variant.   Had MRI of the brain and MRI of the cervical spine done which was negative for any acute abnormality in addition she had a cardiac work-up done which was also unremarkable she reports that the recent carotid ultrasound from April showed bilateral stenosis 50-70% and was advised to see vascular surgery    HISTORY:  Past Medical History:   Diagnosis Date    Abdominal pain     Anxiety     Arthritis     Atypical mole     Back pain     Bad breath     Belching     Bloating     Body piercing     Change in hair     Chest pain     Chronic cough     Decorative tattoo     Diarrhea, unspecified     Dizziness     Factor V Leiden ([address]     Fatigue     Hearing loss     Heart palpitations     Heartburn     History of eating disorder     Hoarseness, chronic     Indigestion     Leaking of urine     Leg swelling     Pain in joints     Problems with swallowing     Seizures (HCC)     Skin blushing/flushing     Sleep disturbance     Sputum production     Stress     Wheezing       Past Surgical History:   Procedure Laterality Date    BREAST SURGERY      COLPOSCOPY,BX CERVIX/ENDOCERV CURR      multiple    D & C      EGD      EXCISIONAL BIOPSY LEFT  04/2021    Fibroadenoma    FRACTURE SURGERY      R. leg    IMPLANT LEFT  2005    Saline    IMPLANT RIGHT  2005    Saline    OTHER SURGICAL HISTORY      Breast Augmentation      Family History   Problem Relation Age of Onset    Diabetes Father     Colon Polyps Father     Hypertension Mother     Diabetes Mother     Alcohol and Other Disorders Associated Mother     Ulcerative Colitis Mother     Breast Cancer Maternal Grandmother 50        dx age 50    Breast Cancer Paternal Aunt         unknown      Social History     Socioeconomic History    Marital status:    Tobacco Use    Smoking status: Former     Packs/day: 1.00     Years: 28.00     Additional pack years: 0.00     Total pack years: 28.00     Types: Cigarettes     Quit date: 8/31/2023     Years since quittin.2    Smokeless tobacco: Never   Vaping Use    Vaping Use: Never used   Substance and Sexual Activity    Alcohol use: Never    Drug use: Never   Other Topics Concern    Caffeine Concern Yes     Comment: coffee 1 cups        Review of Systems   Constitutional: Negative. HENT: Negative. Eyes: Negative. Respiratory: Negative. Cardiovascular: Negative. Gastrointestinal: Negative. Endocrine: Negative. Genitourinary: Negative. Musculoskeletal:  Positive for neck stiffness. Skin: Negative. Allergic/Immunologic: Negative. Neurological:  Positive for dizziness and headaches. Hematological: Negative. Psychiatric/Behavioral: Negative. All other systems reviewed and are negative. Current Outpatient Medications   Medication Sig Dispense Refill    MAGNESIUM OR Take by mouth. cyclobenzaprine 5 MG Oral Tab Take 1 tablet (5 mg total) by mouth nightly as needed. AT BEDTIME      omeprazole 2 mg/mL Oral Suspension Take 10 mL (20 mg total) by mouth.      meclizine 25 MG Oral Tab Take 1 tablet (25 mg total) by mouth 3 (three) times daily as needed for Dizziness. 30 tablet 0    albuterol 108 (90 Base) MCG/ACT Inhalation Aero Soln Inhale 2 puffs into the lungs every 4 (four) hours. Allergies: Allergies   Allergen Reactions    Codeine NAUSEA ONLY and NAUSEA AND VOMITING     PHYSICAL EXAM:   Physical Exam    Blood pressure 128/70, pulse 86, resp. rate 16, weight 187 lb (84.8 kg), last menstrual period 2023. General Appearance: Well nourished, well developed, no apparent distress. HEENT: Normocephalic and atraumatic. Normal sclera. Moist mucus membrane  Neck: Normal range of motion. Neck supple. Cardiovascular: Normal rate, regular rhythm and normal heart sounds. Pulmonary/Chest: Effort normal and breath sounds normal.   Abdominal: Soft.  Bowel sounds are normal.   Skin: dry, clean and intact  Ext: peripheral pulses present  Psych: normal mood and affect    Neurological:  Patient is awake, alert and oriented to person, place and time   Normal memory, attention/concentration, speech and language. Cranial Nerves: II: Visual acuity: normal  II: Visual fields: normal  III: Pupils: equal, round, reactive to light  III,IV,VI: Extra Ocular Movements: intact  V: Facial sensation: intact  VII: Facial strength: intact  VIII: Hearing: intact  IX: Palate: intact  XI: Shoulder shrug: intact  XII: Tongue movement: normal    Motor: Normal tone. Strength is  5 out of 5 in all extremities bilaterally. DTR: present    Sensory: Sensory examination is normal to light touch and pinprick     Coordination: Finger-to-nose, heel-to-shin, and rapid alternating movements are normal bilaterally without evidence of dysmetria. Gait: Casual, toe, heel, and tandem gait are normal.          TESTS/IMAGING:     Outside records MRI of the cervical spine  Impression:   There are multilevel disc bulges present throughout the cervical spine which results in multiple   levels of mild central canal stenosis. Additionally, there are multiple levels of mild foraminal   stenosis appreciated secondary to underlying uncovertebral joint hypertrophy. Please see further   details in the body the report. Straightening of the upper cervical lordosis may indicate underlying cervical spasm. MRI of the brain    Findings: There are no intra-axial or extra-axial fluid collections identified on this exam. No midline shift   or mass effect is present. The ventricles, cisterns and sulci appear normal in size and configuration for patient age. Gray/   white matter differentiation appears well preserved. The midline structures appear well formed. The craniocervical junction appears unremarkable. Major   intracranial flow-voids are well maintained. There is no restricted diffusion identified to suggest an acute ischemic event.           Visualized orbits, paranasal sinuses and mastoid air cells are clear. Impression:         Normal noncontrast MRI of the brain. ASSESSMENT/PLAN:       ICD-10-CM    1. Cervical disc disease  M50.90       2. Occipital neuralgia of left side  M54.81       3. Dizziness  R42 Physical Therapy Referral - [facility]          Dizziness and vertigo, had previous episodes in the past. Suspecting BPPV  MRI brain done in the past was negative for acute abnormality  EEG obtained was negative for seizure activity    Refer to PT for vestibular therapy    2. Left neck/lower facial pain and left-sided headache ? Cervicogenic headache related to cervical spondylosis  Start Gabapentin 300 mg at night    3. Cervical disc disease with radiculopathy C3-4 through C5-6  Reviewed the MRIs cervical spine report from [facility] and most recent EMG report  Follow with pain medicine, cervical MARY ANNE planned          Hinda Leyden, MD  [facility]      This note was prepared using [employer] voice recognition dictation software. As a result errors may occur. When identified these errors have been corrected.  While every attempt is made to correct errors during dictation discrepancies may still exist         Meds This Visit:  Requested Prescriptions      No prescriptions requested or ordered in this encounter       Imaging & Referrals:  None     PG#[id number]

## 2024-02-01 ENCOUNTER — TELEPHONE (OUTPATIENT)
Dept: NEUROLOGY | Facility: CLINIC | Age: 44
End: 2024-02-01

## 2024-02-01 NOTE — TELEPHONE ENCOUNTER
Received neurology clearance from [facility]. Endorsed to provider for signature.

## 2024-02-02 NOTE — TELEPHONE ENCOUNTER
Neurology clearance letter signed by provider and faxed to [place] with fax confirmation received.

## 2024-02-27 PROBLEM — R10.13 ABDOMINAL PAIN, EPIGASTRIC: Status: ACTIVE | Noted: 2024-02-27

## 2024-02-27 PROBLEM — R13.10 DYSPHAGIA, UNSPECIFIED: Status: ACTIVE | Noted: 2024-02-27

## 2024-05-29 ENCOUNTER — APPOINTMENT (OUTPATIENT)
Dept: CT IMAGING | Facility: HOSPITAL | Age: 44
End: 2024-05-29
Attending: EMERGENCY MEDICINE

## 2024-05-29 ENCOUNTER — HOSPITAL ENCOUNTER (EMERGENCY)
Facility: HOSPITAL | Age: 44
Discharge: HOME | End: 2024-05-29
Attending: EMERGENCY MEDICINE

## 2024-05-29 VITALS
HEART RATE: 69 BPM | SYSTOLIC BLOOD PRESSURE: 119 MMHG | HEIGHT: 66 IN | DIASTOLIC BLOOD PRESSURE: 73 MMHG | WEIGHT: 180 LBS | RESPIRATION RATE: 18 BRPM | BODY MASS INDEX: 28.93 KG/M2 | TEMPERATURE: 97 F | OXYGEN SATURATION: 98 %

## 2024-05-29 DIAGNOSIS — R10.30 LOWER ABDOMINAL PAIN: Primary | ICD-10-CM

## 2024-05-29 LAB
ALBUMIN SERPL-MCNC: 3.9 G/DL (ref 3.4–5)
ALBUMIN/GLOB SERPL: 1 {RATIO} (ref 1–2)
ALP LIVER SERPL-CCNC: 80 U/L
ALT SERPL-CCNC: 39 U/L
ANION GAP SERPL CALC-SCNC: 6 MMOL/L (ref 0–18)
AST SERPL-CCNC: 18 U/L (ref 15–37)
BASOPHILS # BLD AUTO: 0.04 X10(3) UL (ref 0–0.2)
BASOPHILS NFR BLD AUTO: 0.3 %
BILIRUB SERPL-MCNC: 0.2 MG/DL (ref 0.1–2)
BILIRUB UR QL STRIP.AUTO: NEGATIVE
BUN BLD-MCNC: 10 MG/DL (ref 9–23)
CALCIUM BLD-MCNC: 9.3 MG/DL (ref 8.5–10.1)
CHLORIDE SERPL-SCNC: 109 MMOL/L (ref 98–112)
CLARITY UR REFRACT.AUTO: CLEAR
CO2 SERPL-SCNC: 23 MMOL/L (ref 21–32)
COLOR UR AUTO: YELLOW
CREAT BLD-MCNC: 0.81 MG/DL
EGFRCR SERPLBLD CKD-EPI 2021: 93 ML/MIN/1.73M2 (ref 60–?)
EOSINOPHIL # BLD AUTO: 0.18 X10(3) UL (ref 0–0.7)
EOSINOPHIL NFR BLD AUTO: 1.5 %
ERYTHROCYTE [DISTWIDTH] IN BLOOD BY AUTOMATED COUNT: 12.7 %
GLOBULIN PLAS-MCNC: 3.8 G/DL (ref 2.8–4.4)
GLUCOSE BLD-MCNC: 117 MG/DL (ref 70–99)
GLUCOSE UR STRIP.AUTO-MCNC: NORMAL MG/DL
HCT VFR BLD AUTO: 39.1 %
HGB BLD-MCNC: 13.3 G/DL
IMM GRANULOCYTES # BLD AUTO: 0.03 X10(3) UL (ref 0–1)
IMM GRANULOCYTES NFR BLD: 0.3 %
KETONES UR STRIP.AUTO-MCNC: NEGATIVE MG/DL
LEUKOCYTE ESTERASE UR QL STRIP.AUTO: NEGATIVE
LIPASE SERPL-CCNC: 34 U/L (ref 13–75)
LYMPHOCYTES # BLD AUTO: 3.88 X10(3) UL (ref 1–4)
LYMPHOCYTES NFR BLD AUTO: 32.7 %
MCH RBC QN AUTO: 30 PG (ref 26–34)
MCHC RBC AUTO-ENTMCNC: 34 G/DL (ref 31–37)
MCV RBC AUTO: 88.1 FL
MONOCYTES # BLD AUTO: 0.88 X10(3) UL (ref 0.1–1)
MONOCYTES NFR BLD AUTO: 7.4 %
NEUTROPHILS # BLD AUTO: 6.86 X10 (3) UL (ref 1.5–7.7)
NEUTROPHILS # BLD AUTO: 6.86 X10(3) UL (ref 1.5–7.7)
NEUTROPHILS NFR BLD AUTO: 57.8 %
NITRITE UR QL STRIP.AUTO: NEGATIVE
OSMOLALITY SERPL CALC.SUM OF ELEC: 286 MOSM/KG (ref 275–295)
PH UR STRIP.AUTO: 5.5 [PH] (ref 5–8)
PLATELET # BLD AUTO: 378 10(3)UL (ref 150–450)
POTASSIUM SERPL-SCNC: 3.6 MMOL/L (ref 3.5–5.1)
PROT SERPL-MCNC: 7.7 G/DL (ref 6.4–8.2)
RBC # BLD AUTO: 4.44 X10(6)UL
RBC UR QL AUTO: NEGATIVE
SODIUM SERPL-SCNC: 138 MMOL/L (ref 136–145)
SP GR UR STRIP.AUTO: 1.03 (ref 1–1.03)
UROBILINOGEN UR STRIP.AUTO-MCNC: NORMAL MG/DL
WBC # BLD AUTO: 11.9 X10(3) UL (ref 4–11)

## 2024-05-29 PROCEDURE — 85025 COMPLETE CBC W/AUTO DIFF WBC: CPT | Performed by: EMERGENCY MEDICINE

## 2024-05-29 PROCEDURE — 83690 ASSAY OF LIPASE: CPT

## 2024-05-29 PROCEDURE — 36415 COLL VENOUS BLD VENIPUNCTURE: CPT

## 2024-05-29 PROCEDURE — 99285 EMERGENCY DEPT VISIT HI MDM: CPT

## 2024-05-29 PROCEDURE — 81003 URINALYSIS AUTO W/O SCOPE: CPT | Performed by: EMERGENCY MEDICINE

## 2024-05-29 PROCEDURE — 85025 COMPLETE CBC W/AUTO DIFF WBC: CPT

## 2024-05-29 PROCEDURE — 83690 ASSAY OF LIPASE: CPT | Performed by: EMERGENCY MEDICINE

## 2024-05-29 PROCEDURE — 80053 COMPREHEN METABOLIC PANEL: CPT

## 2024-05-29 PROCEDURE — 80053 COMPREHEN METABOLIC PANEL: CPT | Performed by: EMERGENCY MEDICINE

## 2024-05-29 PROCEDURE — 74177 CT ABD & PELVIS W/CONTRAST: CPT | Performed by: EMERGENCY MEDICINE

## 2024-05-29 PROCEDURE — 99284 EMERGENCY DEPT VISIT MOD MDM: CPT

## 2024-05-29 NOTE — ED INITIAL ASSESSMENT (HPI)
Pt sent to ED by OBGYN c/o abdominal and pelvic pain for 3 day, states she had CT that shows kidney stone/free air in peritoneal cavity and patient states OBGYN concerned for perforation, +nausea

## 2024-05-30 NOTE — ED PROVIDER NOTES
Patient Seen in: [facility] Emergency Department      History     Chief Complaint   Patient presents with    Abdomen/Flank Pain     Stated Complaint: abd pain    Subjective:   HPI    Patient is a 43-year-old female who presents for evaluation of diffuse lower abdominal pain.  She is status post hysterectomy little over 2 months ago.  Overall has been recovering well.  However while having intercourse with her  3 days ago she had fairly sudden onset of diffuse lower abdominal pain, initially worse in the right but now more on the left.  She followed up with her gynecologist yesterday and per report he did not see any evidence of a cuff leak or dehiscence.  She had an outpatient CT yesterday, not through our system but a written report that the patient has with her demonstrates small volume pneumoperitoneum.  No other obvious intra-abdominal abnormality.  Patient states pain continues today, she called her gynecologist and they told her to come to the ER.    Objective:   Past Medical History:    Abdominal pain    Anxiety    Arthritis    Atypical mole    Back pain    Bad breath    Belching    Bleeding nose    Bloating    Body piercing    Change in hair    Chest pain    Chronic cough    Decorative tattoo    Diarrhea, unspecified    Dizziness    Factor V Leiden (HCC)    Fatigue    Flatulence/gas pain/belching    Hearing loss    Heart palpitations    Heartburn    History of eating disorder    Hoarseness, chronic    Indigestion    Leaking of urine    Leg swelling    Osteoporosis    Pain in joints    Problems with swallowing    Seizures (HCC)    Skin blushing/flushing    Sleep disturbance    Sputum production    Stress    Weight gain    Wheezing              Past Surgical History:   Procedure Laterality Date    Breast surgery      Colposcopy,bx cervix/endocerv curr      multiple    D & c      Egd      Excisional biopsy left  04/2021    Fibroadenoma    Fracture surgery      R. leg    Implant left  2005     Saline    Implant right  2005    Saline    Other surgical history      Breast Augmentation                Social History     Socioeconomic History    Marital status:    Tobacco Use    Smoking status: Former     Current packs/day: 0.00     Average packs/day: 1 pack/day for 28.0 years (28.0 ttl pk-yrs)     Types: Cigarettes     Start date: 1995     Quit date: 2023     Years since quittin.7    Smokeless tobacco: Never   Vaping Use    Vaping status: Never Used   Substance and Sexual Activity    Alcohol use: Never    Drug use: Never   Other Topics Concern    Caffeine Concern Yes     Comment: coffee 1 cups              Review of Systems    Positive for stated complaint: abd pain  Other systems are as noted in HPI.  Constitutional and vital signs reviewed.      All other systems reviewed and negative except as noted above.    Physical Exam     ED Triage Vitals [24 1823]   /70   Pulse 92   Resp 18   Temp 97 °F (36.1 °C)   Temp src Temporal   SpO2 98 %   O2 Device None (Room air)       Current Vitals:   Vital Signs  BP: 119/73  Pulse: 69  Resp: 18  Temp: 97 °F (36.1 °C)  Temp src: Temporal  MAP (mmHg): 86    Oxygen Therapy  SpO2: 98 %  O2 Device: None (Room air)            Physical Exam  Vitals and nursing note reviewed.   Constitutional:       Appearance: She is well-developed.   HENT:      Head: Normocephalic and atraumatic.   Eyes:      Conjunctiva/sclera: Conjunctivae normal.      Pupils: Pupils are equal, round, and reactive to light.   Cardiovascular:      Rate and Rhythm: Normal rate and regular rhythm.      Heart sounds: Normal heart sounds.   Pulmonary:      Effort: Pulmonary effort is normal.      Breath sounds: Normal breath sounds.   Abdominal:      General: Bowel sounds are normal.      Palpations: Abdomen is soft.      Comments: Mild diffuse abdominal tenderness, more pronounced in the lower abdomen.  No rebound or guarding.   Musculoskeletal:         General: Normal range of  motion.      Cervical back: Normal range of motion and neck supple.   Skin:     General: Skin is warm and dry.   Neurological:      Mental Status: She is alert and oriented to person, place, and time.               ED Course     Labs Reviewed   COMP METABOLIC PANEL (14) - Abnormal; Notable for the following components:       Result Value    Glucose 117 (*)     All other components within normal limits   URINALYSIS WITH CULTURE REFLEX - Abnormal; Notable for the following components:    Protein Urine Trace (*)     All other components within normal limits   CBC W/ DIFFERENTIAL - Abnormal; Notable for the following components:    WBC 11.9 (*)     All other components within normal limits   LIPASE - Normal   CBC WITH DIFFERENTIAL WITH PLATELET    Narrative:     The following orders were created for panel order CBC With Differential With Platelet.  Procedure                               Abnormality         Status                     ---------                               -----------         ------                     CBC W/ DIFFERENTIAL[[id number]]          Abnormal            Final result                 Please view results for these tests on the individual orders.             CT ABDOMEN+PELVIS(CONTRAST ONLY)(CPT=74177)    Result Date: 5/29/2024  PROCEDURE:  CT ABDOMEN+PELVIS (CONTRAST ONLY) (CPT=74177)  COMPARISON:  None.  INDICATIONS:  abd pain  TECHNIQUE:  CT scanning was performed from the dome of the diaphragm to the pubic symphysis with non-ionic intravenous contrast material. Post contrast coronal MPR imaging was performed.  Dose reduction techniques were used. Dose information is transmitted to the ACR (American College of Radiology) NRDR (National Radiology Data Registry) which includes the Dose Index Registry.  PATIENT STATED HISTORY:(As transcribed by Technologist)  c/o abdominal and pelvic pain for 3 days. Pt states she had a hysterectomy 2 months ago.   CONTRAST USED:  80cc of Isovue 370  FINDINGS:   LIVER:  No enlargement, atrophy, abnormal density, or significant focal lesion.  BILIARY:  No visible dilatation or calcification.  PANCREAS:  No lesion, fluid collection, ductal dilatation, or atrophy.  SPLEEN:  No enlargement or focal lesion.  KIDNEYS:  Nonobstructing 2 mm calculi in lower pole of right kidney and midpole of left kidney.  No mass or hydronephrosis. ADRENALS:  No mass or enlargement.  AORTA/VASCULAR:  No aneurysm or dissection.  RETROPERITONEUM:  No mass or adenopathy.  BOWEL/MESENTERY:  Uncomplicated colonic diverticula are most numerous in the sigmoid colon.  No bowel distention or bowel wall thickening.  Normal appendix. ABDOMINAL WALL:  Small fat containing umbilical hernia. URINARY BLADDER:  Collapsed.  No calculus. PELVIC NODES:  No adenopathy.  PELVIC ORGANS:  Surgically absent uterus. BONES:  No bony lesion or fracture.  LUNG BASES:  No visible pulmonary or pleural disease.              CONCLUSION:  1. No acute abdominal or pelvic pathology. 2. Bilateral nephrolithiasis. 3. Uncomplicated colonic diverticula. 4. Small fat containing umbilical hernia.   LOCATION:  [facility]   Dictated by (CST): Dick Fernandez MD on 5/29/2024 at 8:39 PM     Finalized by (CST): Dick Fernandez MD on 5/29/2024 at 8:45 PM      Isrslt           MDM      43-year-old female presenting for evaluation of diffuse low abdominal pain.  2-month status post hysterectomy, pain started during intercourse 3 days ago and has been there since.  As noted above, small volume pneumoperitoneum noted on outpatient CT yesterday.  She saw her gynecologist who did not find any evidence of vaginal cuff dehiscence but as the pain continues today she was sent to the ED.  Vitals are unremarkable with no fever, tachycardia, hypotension.  Repeat a CT here to evaluate for increased volume of pneumoperitoneum or other intra-abdominal pathology.      Update at 10:05 PM.  Labs unremarkable.  No leukocytosis.  CT as above without any evidence of  pneumoperitoneum.  Patient ne comfortable in the room.  Suspect maybe she had a small leak during intercourse 3 days ago and that was ER noted yesterday but has resolved today.  No intra-abdominal inflammatory process.  Discussed results.  She is comfortable going home.  She has follow-up with her gynecologist tomorrow.        Past Medical History-GERD     Differential diagnosis before testing included perforated viscus, UTI, pyelonephritis    Co-morbidities that add to the complexity of management include: None    Testing ordered during this visit included labs, CT    Radiographic images  I personally reviewed the radiographs and my individual interpretation shows no free air or free fluid  I also reviewed the official reports that showed no free air or free fluid            Disposition:          Discharge  I have discussed with the patient the results of test, differential diagnosis, treatment plan, warning signs and symptoms which should prompt immediate return.  They expressed understanding of these instructions and agrees to the following plan provided.  They were given written discharge instructions and agrees to return for any concerns and voiced understanding and all questions were answered.                           Medical Decision Making      Disposition and Plan     Clinical Impression:  1. Lower abdominal pain         Disposition:  Discharge  5/29/2024 10:08 pm    Follow-up:  Virgilio Angulo PA  [address] [place] [address] [id number] [phone]    Follow up            Medications Prescribed:  Current Discharge Medication List

## 2024-05-30 NOTE — ED QUICK NOTES
Patient states she had hysterectomy on 3/20/24. Had sex with her  after and no issues. This past weekend she had sex with her , and felt severe pelvic pain. \"It's like he hit something.\" States she became diaphoretic and nauseated from the pain. Had drops of blood coming from vagina. Has been seen by Gyne this week, had CT showing small amount of free air. Was referred to ER for possible microperforation. Patient states she's had lower abdominal/pelvic pain, bloating.

## 2024-06-07 ENCOUNTER — LAB ENCOUNTER (OUTPATIENT)
Dept: LAB | Age: 44
End: 2024-06-07
Payer: COMMERCIAL

## 2024-06-07 DIAGNOSIS — R74.01 ELEVATED ALT MEASUREMENT: ICD-10-CM

## 2024-06-07 LAB
HAV AB SER QL IA: NONREACTIVE
HBV CORE AB SERPL QL IA: NONREACTIVE
HBV SURFACE AB SER QL: NONREACTIVE
HBV SURFACE AB SERPL IA-ACNC: 7.03 MIU/ML
HBV SURFACE AG SER-ACNC: <0.1 [IU]/L
HBV SURFACE AG SERPL QL IA: NONREACTIVE
HCV AB SERPL QL IA: NONREACTIVE

## 2024-06-07 PROCEDURE — 86803 HEPATITIS C AB TEST: CPT

## 2024-06-07 PROCEDURE — 36415 COLL VENOUS BLD VENIPUNCTURE: CPT

## 2024-06-07 PROCEDURE — 86704 HEP B CORE ANTIBODY TOTAL: CPT

## 2024-06-07 PROCEDURE — 86706 HEP B SURFACE ANTIBODY: CPT

## 2024-06-07 PROCEDURE — 87340 HEPATITIS B SURFACE AG IA: CPT

## 2024-06-07 PROCEDURE — 86708 HEPATITIS A ANTIBODY: CPT

## 2024-07-01 ENCOUNTER — HOSPITAL ENCOUNTER (OUTPATIENT)
Dept: ULTRASOUND IMAGING | Age: 44
Discharge: HOME | End: 2024-07-01
Payer: COMMERCIAL

## 2024-07-01 DIAGNOSIS — R74.01 ELEVATED ALT MEASUREMENT: ICD-10-CM

## 2024-07-01 PROCEDURE — 76700 US EXAM ABDOM COMPLETE: CPT

## 2024-08-28 ENCOUNTER — OFFICE VISIT (OUTPATIENT)
Facility: CLINIC | Age: 44
End: 2024-08-28
Payer: COMMERCIAL

## 2024-08-28 VITALS
WEIGHT: 221 LBS | OXYGEN SATURATION: 99 % | DIASTOLIC BLOOD PRESSURE: 65 MMHG | HEART RATE: 72 BPM | RESPIRATION RATE: 18 BRPM | BODY MASS INDEX: 36 KG/M2 | SYSTOLIC BLOOD PRESSURE: 97 MMHG | TEMPERATURE: 97 F

## 2024-08-28 DIAGNOSIS — N64.52 BILATERAL NIPPLE DISCHARGE: Primary | ICD-10-CM

## 2024-08-28 DIAGNOSIS — R92.1 BREAST CALCIFICATIONS ON MAMMOGRAM: ICD-10-CM

## 2024-08-28 PROCEDURE — 99205 OFFICE O/P NEW HI 60 MIN: CPT | Performed by: SURGERY

## 2024-08-28 NOTE — PATIENT INSTRUCTIONS
Mammogram: Follow up diagnostic mammogram 9/2024  MRI: Breast Mri ordered       Return to clinic: 6 months to follow up on symptoms

## 2024-08-28 NOTE — CONSULTS
Breast Surgery New Patient Consultation    Molina Sahu is a 43 year old patient, referred by CAROLYN Ortiz, who presents for evaluation of left chest and arm pain as well as bilateral nipple discharge.    History of Present Illness:   Ms. Molina Sahu is a 43 year old woman with a past history significant for breast augmentation and subsequent implant removal in 2022 who presents for evaluation of left chest and arm pain as well as bilateral nipple discharge.     History of breast implants, removed in 2022. Diagnostic mammogram 3/19/2024 showed lower posterior left breast calcifications (probably benign) and lower outer breast skin thickening. Findings were thought to represent post-surgical changes. There was no imaging abnormality in the right or left breast to explain the nipple discharge. 6 month follow up ultrasound was recommended. She has density C breast tissue. Since surgery, she has been having symptoms of left arm and shoulder \"tightness\" and decreased range of motion. She has burning pain of the left medial breast that is constant. She has not felt \"good\" and has had episodes of dizziness and weakness since her surgery. She has much less energy to perform her daily tasks. She has been to many physicians about this, including many specialists. At this time she does follow with physical therapy and is doing arm exercises. She also undergoes massage of the breast and scar tissue.    She has also noticed right breast nipple discharge since about September 2023. It has been clear/yellow in color and provoked. She has not noticed wetness in her bra. She denies red or brown discharge. Prolactin level on 7/24 was 1.2 (normal range 4.7-23.3). Her prolactin on 4/30 was 6.7 (normal range 3.34-26.7). Her prolactin level on 3/14 was 24.1 which was elevated based on the outside laboratory (normal 2.1-17.7). She has seen endocrinology for this and, per the patient, \"everything looks ok\".     She denies any  palpable masses, skin changes, or axillary adenopathy.  She does have a past history for breast diseases or breast biopsy: she has had an MRI guided breast biopsy in 2021 that was benign (fragments of fibroadenoma). She does have family history of breast cancer. Maternal grandmother had breast cancer at age 48. A paternal aunt had breast cancer at an unknown age. She does not have a history of genetic testing for breast cancer genes. She reports an MTHFR mutation. She has fear of missing an important diagnosis due to her father passing away suddenly from bone cancer and not being diagnosed for a long time even though he was having symptoms.           Past Medical History:    Abdominal pain    Anxiety    Arthritis    Atypical mole    Back pain    Bad breath    Belching    Bleeding nose    Bloating    Body piercing    Change in hair    Chest pain    Chronic cough    Decorative tattoo    Diabetes mellitus (HCC)    Diarrhea, unspecified    Dizziness    Factor V Leiden (HCC)    Fatigue    Flatulence/gas pain/belching    Hearing loss    Heart palpitations    Heartburn    History of eating disorder    Hoarseness, chronic    Indigestion    Leaking of urine    Leg swelling    Nausea    Osteoporosis    Pain in joints    Problems with swallowing    Seizures (HCC)    Skin blushing/flushing    Sleep disturbance    Sputum production    Stress    Uncomfortable fullness after meals    Weight gain    Wheezing       Past Surgical History:   Procedure Laterality Date    Breast surgery      Colposcopy,bx cervix/endocerv curr      multiple    D & c      Egd      Excisional biopsy left  2021    Fibroadenoma    Fracture surgery      R. leg    Implant left      Saline    Implant right      Saline    Other surgical history      Breast Augmentation       Gynecological History:  Menarche at age 12 and LMP 3/20/24  Pt is a   Pt was 18 years old at time of first pregnancy.    She has cumulative breastfeeding history of 8  months.  Age of Menopause: hysterectomy age 43  Type: surgical (hysterectomy with ovaries left in)  She denies any history of hormone replacement therapy  She has history of oral contraceptive use last 2022.   She denies infertility treatment to achieve pregnancy.    Medications:     Omeprazole 40 MG Oral Capsule Delayed Release Take 1 capsule (40 mg total) by mouth daily. 90 capsule 0    MAGNESIUM OR Take by mouth.      amitriptyline 25 MG Oral Tab Take 1 tablet (25 mg total) by mouth nightly. Take 1/2 tablet (12.5mg) for the first 3 days, then go up to 1 tablet nightly. 30 tablet 5    meclizine 25 MG Oral Tab Take 1 tablet (25 mg total) by mouth 3 (three) times daily as needed for Dizziness. 30 tablet 0    albuterol 108 (90 Base) MCG/ACT Inhalation Aero Soln Inhale 2 puffs into the lungs every 4 (four) hours.         Allergies:    Allergies   Allergen Reactions    Codeine NAUSEA ONLY and NAUSEA AND VOMITING       Family History:   Family History   Problem Relation Age of Onset    Diabetes Father     Colon Polyps Father     Hypertension Mother     Diabetes Mother     Alcohol and Other Disorders Associated Mother     Ulcerative Colitis Mother     Breast Cancer Maternal Grandmother 48        dx age 48    Breast Cancer Paternal Aunt         unknown       She is not of Ashkenazi [employer]ec[employer] ancestry.    Social History:  History   Alcohol Use Never       History   Smoking Status    Former    Types: Cigarettes   Smokeless Tobacco    Never       Review of Systems:    Review of Systems   Constitutional:  Positive for appetite change and fatigue. Negative for activity change, chills and unexpected weight change.   HENT:  Positive for ear pain. Negative for hearing loss, nosebleeds, sore throat, trouble swallowing and voice change.    Eyes:  Negative for pain and visual disturbance.   Respiratory:  Positive for shortness of breath. Negative for cough and chest tightness.    Cardiovascular:  Positive for chest pain and  palpitations. Negative for leg swelling.   Gastrointestinal:  Positive for heartburn and abdominal pain. Negative for nausea, vomiting, diarrhea, constipation and blood in stool.   Endocrine: Negative for cold intolerance and heat intolerance.   Genitourinary:  Positive for bladder incontinence and frequency. Negative for dysuria, hematuria and difficulty urinating.   Musculoskeletal:  Negative for back pain, joint swelling, joint pain and neck pain.   Skin:  Negative for color change, rash and wound.   Allergic/Immunologic: Negative for environmental allergies.   Neurological:  Positive for dizziness, weakness and headaches. Negative for tremors, syncope, facial asymmetry and speech difficulty.   Hematological:  Negative for adenopathy. Does not bruise/bleed easily.   Psychiatric/Behavioral:  Positive for sleep disturbance. Negative for hallucinations, behavioral problems, confusion, agitation and depressed mood.       Otherwise as per HPI.    Physical Exam:    /79 (BP Location: Right arm, Patient Position: Sitting, Cuff Size: adult)   Pulse 68   Temp 96.6 °F (35.9 °C)   Resp 16   Wt 100.2 kg (221 lb)   LMP 03/01/2024 (Approximate)   SpO2 95%   BMI 35.67 kg/m²     Physical Exam  Vitals reviewed.   Constitutional:       Appearance: Normal appearance.   HENT:      Head: Normocephalic and atraumatic.   Eyes:      Extraocular Movements: Extraocular movements intact.      Pupils: Pupils are equal, round, and reactive to light.   Cardiovascular:      Rate and Rhythm: Normal rate.   Pulmonary:      Effort: Pulmonary effort is normal.   Chest:   Breasts:     Right: Nipple discharge present. No mass, skin change or tenderness.      Left: Nipple discharge present. No mass, skin change or tenderness.          Comments: Clear, bilateral, provoked nipple discharge coming from more than one duct    Well healed surgical incisions  Abdominal:      General: Abdomen is flat.      Palpations: Abdomen is soft.    Musculoskeletal:         General: Normal range of motion.      Cervical back: Normal range of motion and neck supple.   Lymphadenopathy:      Upper Body:      Right upper body: No supraclavicular or axillary adenopathy.      Left upper body: No supraclavicular or axillary adenopathy.   Skin:     General: Skin is warm and dry.   Neurological:      General: No focal deficit present.      Mental Status: She is alert and oriented to person, place, and time.   Psychiatric:         Mood and Affect: Mood normal.          Bra size: (L)    Labs/imaging: reviewed in EPIC    Impression:   Ms. Molina Sahu is a 43 year old woman that presents for bilateral nipple discharge and left breast/chest and left arm decreased range of motion and pain.    Discussion and Plan:  I had a discussion with the Patient regarding her breast exam. On exam today I found bilateral, provoked, clear nipple discharge coming from more than one duct. I personally reviewed her recent imaging and we discussed this.      She has not had a breast MRI since her symptoms began. The diagnostic utility would come in to rule out any bilateral pathology causing her nipple discharge and also to re-evaluate her left breast calcifications and presumed fat necrosis tissue.  She is seeing many specialists and physicians for her multiple symptoms and we agreed that the Mri was not an emergency and she can complete it when is convenient for her.  I do not suspect any breast cancer at this point for this patient. She has follow up mammogram already ordered for September 2024 to follow up on calcifications.    We discussed nipple discharge and that it can be physiological (due to lactation) or pathological (due to mass). She did have elevated prolactin but the nipple discharge persists even when prolactin is within normal limits. She also reports a brain Mri that did not show any pituitary abnormality. We discussed that if the nipple discharge is caused by a mass,  usually we are able to see it on imaging. Worrisome nipple discharge would be bloody or clear, coming from one duct, and one breast (not both). What usually causes unilateral, brown/bloody nipple discharge is a mass called papilloma. These are usually benign but we often do remove them. Sometimes we can watch them with imaging.     Further imaging:   Mammogram: Follow up diagnostic mammogram 9/2024  MRI: Breast Mri ordered      Return to clinic: 6 months to follow up on symptoms    She was given ample opportunity for questions and those questions were answered to her satisfaction. She has been encouraged to contact the office with any questions or concerns. This encounter lasted a total of 60 minutes, more than 50% of which was dedicated to the discussion of management options.     Breana Villalpando MD  Breast Surgical Oncology    CC: Virgilio LEON

## 2024-09-06 ENCOUNTER — HOSPITAL ENCOUNTER (EMERGENCY)
Facility: HOSPITAL | Age: 44
Discharge: HOME | End: 2024-09-06
Attending: EMERGENCY MEDICINE
Payer: COMMERCIAL

## 2024-09-06 ENCOUNTER — APPOINTMENT (OUTPATIENT)
Dept: CT IMAGING | Facility: HOSPITAL | Age: 44
End: 2024-09-06
Attending: EMERGENCY MEDICINE
Payer: COMMERCIAL

## 2024-09-06 VITALS
DIASTOLIC BLOOD PRESSURE: 76 MMHG | TEMPERATURE: 98 F | SYSTOLIC BLOOD PRESSURE: 115 MMHG | BODY MASS INDEX: 36 KG/M2 | HEART RATE: 69 BPM | OXYGEN SATURATION: 99 % | RESPIRATION RATE: 16 BRPM | WEIGHT: 220.88 LBS

## 2024-09-06 DIAGNOSIS — K42.9 UMBILICAL HERNIA WITHOUT OBSTRUCTION AND WITHOUT GANGRENE: ICD-10-CM

## 2024-09-06 DIAGNOSIS — R10.9 ABDOMINAL PAIN OF UNKNOWN ETIOLOGY: Primary | ICD-10-CM

## 2024-09-06 LAB
ALBUMIN SERPL-MCNC: 5.3 G/DL (ref 3.2–4.8)
ALBUMIN/GLOB SERPL: 1.8 {RATIO} (ref 1–2)
ALP LIVER SERPL-CCNC: 95 U/L
ALT SERPL-CCNC: 56 U/L
ANION GAP SERPL CALC-SCNC: 8 MMOL/L (ref 0–18)
AST SERPL-CCNC: 36 U/L (ref ?–34)
BASOPHILS # BLD AUTO: 0.06 X10(3) UL (ref 0–0.2)
BASOPHILS NFR BLD AUTO: 0.5 %
BILIRUB SERPL-MCNC: 0.2 MG/DL (ref 0.3–1.2)
BILIRUB UR QL STRIP.AUTO: NEGATIVE
BUN BLD-MCNC: 11 MG/DL (ref 9–23)
CALCIUM BLD-MCNC: 10.6 MG/DL (ref 8.7–10.4)
CHLORIDE SERPL-SCNC: 105 MMOL/L (ref 98–112)
CLARITY UR REFRACT.AUTO: CLEAR
CO2 SERPL-SCNC: 26 MMOL/L (ref 21–32)
CREAT BLD-MCNC: 0.83 MG/DL
EGFRCR SERPLBLD CKD-EPI 2021: 90 ML/MIN/1.73M2 (ref 60–?)
EOSINOPHIL # BLD AUTO: 0.19 X10(3) UL (ref 0–0.7)
EOSINOPHIL NFR BLD AUTO: 1.7 %
ERYTHROCYTE [DISTWIDTH] IN BLOOD BY AUTOMATED COUNT: 12.5 %
GLOBULIN PLAS-MCNC: 3 G/DL (ref 2–3.5)
GLUCOSE BLD-MCNC: 88 MG/DL (ref 70–99)
GLUCOSE UR STRIP.AUTO-MCNC: NORMAL MG/DL
HCG SERPL QL: NEGATIVE
HCT VFR BLD AUTO: 41.8 %
HGB BLD-MCNC: 14.7 G/DL
IMM GRANULOCYTES # BLD AUTO: 0.03 X10(3) UL (ref 0–1)
IMM GRANULOCYTES NFR BLD: 0.3 %
KETONES UR STRIP.AUTO-MCNC: NEGATIVE MG/DL
LEUKOCYTE ESTERASE UR QL STRIP.AUTO: NEGATIVE
LIPASE SERPL-CCNC: 40 U/L (ref 12–53)
LYMPHOCYTES # BLD AUTO: 4.01 X10(3) UL (ref 1–4)
LYMPHOCYTES NFR BLD AUTO: 35.1 %
MCH RBC QN AUTO: 30.4 PG (ref 26–34)
MCHC RBC AUTO-ENTMCNC: 35.2 G/DL (ref 31–37)
MCV RBC AUTO: 86.5 FL
MONOCYTES # BLD AUTO: 0.73 X10(3) UL (ref 0.1–1)
MONOCYTES NFR BLD AUTO: 6.4 %
NEUTROPHILS # BLD AUTO: 6.42 X10 (3) UL (ref 1.5–7.7)
NEUTROPHILS # BLD AUTO: 6.42 X10(3) UL (ref 1.5–7.7)
NEUTROPHILS NFR BLD AUTO: 56 %
NITRITE UR QL STRIP.AUTO: NEGATIVE
OSMOLALITY SERPL CALC.SUM OF ELEC: 287 MOSM/KG (ref 275–295)
PH UR STRIP.AUTO: 5.5 [PH] (ref 5–8)
PLATELET # BLD AUTO: 386 10(3)UL (ref 150–450)
POTASSIUM SERPL-SCNC: 3.9 MMOL/L (ref 3.5–5.1)
PROT SERPL-MCNC: 8.3 G/DL (ref 5.7–8.2)
PROT UR STRIP.AUTO-MCNC: NEGATIVE MG/DL
RBC # BLD AUTO: 4.83 X10(6)UL
RBC UR QL AUTO: NEGATIVE
SODIUM SERPL-SCNC: 139 MMOL/L (ref 136–145)
SP GR UR STRIP.AUTO: 1.01 (ref 1–1.03)
UROBILINOGEN UR STRIP.AUTO-MCNC: NORMAL MG/DL
WBC # BLD AUTO: 11.4 X10(3) UL (ref 4–11)

## 2024-09-06 PROCEDURE — 83690 ASSAY OF LIPASE: CPT

## 2024-09-06 PROCEDURE — 85025 COMPLETE CBC W/AUTO DIFF WBC: CPT

## 2024-09-06 PROCEDURE — 74177 CT ABD & PELVIS W/CONTRAST: CPT | Performed by: EMERGENCY MEDICINE

## 2024-09-06 PROCEDURE — 36415 COLL VENOUS BLD VENIPUNCTURE: CPT

## 2024-09-06 PROCEDURE — 81003 URINALYSIS AUTO W/O SCOPE: CPT | Performed by: EMERGENCY MEDICINE

## 2024-09-06 PROCEDURE — 80053 COMPREHEN METABOLIC PANEL: CPT

## 2024-09-06 PROCEDURE — 99284 EMERGENCY DEPT VISIT MOD MDM: CPT

## 2024-09-06 PROCEDURE — 83690 ASSAY OF LIPASE: CPT | Performed by: EMERGENCY MEDICINE

## 2024-09-06 PROCEDURE — 85025 COMPLETE CBC W/AUTO DIFF WBC: CPT | Performed by: EMERGENCY MEDICINE

## 2024-09-06 PROCEDURE — 84703 CHORIONIC GONADOTROPIN ASSAY: CPT | Performed by: EMERGENCY MEDICINE

## 2024-09-06 PROCEDURE — 80053 COMPREHEN METABOLIC PANEL: CPT | Performed by: EMERGENCY MEDICINE

## 2024-09-06 NOTE — ED INITIAL ASSESSMENT (HPI)
Pt reports periumbilical pain last few days, worse with stretching/bending/walking; recent CT from 3 months ago showed a umbilical hernia; no V/D. This morning pain was worst and advise to come to the ED. Pt states she is recovering from a  URI/sinus infection with a lot of coughing. Hysterectomy in March.

## 2024-09-07 NOTE — ED PROVIDER NOTES
Patient Seen in: [facility] Emergency Department      History     Chief Complaint   Patient presents with    Abdomen/Flank Pain     Stated Complaint: Abd pain, possible hernia from PMD office.    Subjective:   HPI    43-year-old female with past medical history of diabetes presents today for abdominal pain.  2 days ago, patient began having a periumbilical abdominal pain.  The pain intensified today and she was seen at an immediate care.  She was advised to follow-up with the surgeon however could not get in until Monday and she was told by the office to come to the ER for evaluation.  Patient denies any fevers, vomiting, dysuria, constipation or any other physical symptoms.    Objective:   Past Medical History:    Abdominal pain    Anxiety    Arthritis    Atypical mole    Back pain    Bad breath    Belching    Bleeding nose    Bloating    Body piercing    Change in hair    Chest pain    Chronic cough    Decorative tattoo    Diabetes mellitus (HCC)    Diarrhea, unspecified    Dizziness    Factor V Leiden (HCC)    Fatigue    Flatulence/gas pain/belching    Hearing loss    Heart palpitations    Heartburn    History of eating disorder    Hoarseness, chronic    Indigestion    Leaking of urine    Leg swelling    Nausea    Osteoporosis    Pain in joints    Problems with swallowing    Seizures (HCC)    Skin blushing/flushing    Sleep disturbance    Sputum production    Stress    Uncomfortable fullness after meals    Weight gain    Wheezing              Past Surgical History:   Procedure Laterality Date    Breast surgery      Colposcopy,bx cervix/endocerv curr      multiple    D & c      Egd      Excisional biopsy left  04/2021    Fibroadenoma    Fracture surgery      R. leg    Implant left  2005    Saline    Implant right  2005    Saline    Other surgical history      Breast Augmentation                Social History     Socioeconomic History    Marital status:    Tobacco Use    Smoking status: Former      Current packs/day: 0.00     Average packs/day: 1 pack/day for 28.0 years (28.0 ttl pk-yrs)     Types: Cigarettes     Start date: 1995     Quit date: 2023     Years since quittin.0    Smokeless tobacco: Never   Vaping Use    Vaping status: Never Used   Substance and Sexual Activity    Alcohol use: Never    Drug use: Never   Other Topics Concern    Caffeine Concern Yes     Comment: coffee 1 cups              Review of Systems    Positive for stated Chief Complaint: Abdomen/Flank Pain    Other systems are as noted in HPI.  Constitutional and vital signs reviewed.      All other systems reviewed and negative except as noted above.    Physical Exam     ED Triage Vitals [24 1648]   /73   Pulse 86   Resp 22   Temp 97.6 °F (36.4 °C)   Temp src Oral   SpO2 99 %   O2 Device None (Room air)       Current Vitals:   Vital Signs  BP: 141/84  Pulse: 90  Resp: 16  Temp: 97.9 °F (36.6 °C)  Temp src: Oral    Oxygen Therapy  SpO2: 99 %  O2 Device: None (Room air)            Physical Exam  Vitals and nursing note reviewed.   Constitutional:       Appearance: Normal appearance.   HENT:      Head: Normocephalic.      Nose: Nose normal.      Mouth/Throat:      Mouth: Mucous membranes are moist.   Eyes:      Extraocular Movements: Extraocular movements intact.   Cardiovascular:      Rate and Rhythm: Normal rate.   Pulmonary:      Effort: Pulmonary effort is normal.   Abdominal:      General: Abdomen is flat.      Tenderness: There is abdominal tenderness in the periumbilical area and left lower quadrant.      Comments: Umbilical and left lower quadrant tenderness without guarding or rigidity.   Musculoskeletal:         General: Normal range of motion.   Skin:     General: Skin is warm.   Neurological:      General: No focal deficit present.      Mental Status: She is alert.   Psychiatric:         Mood and Affect: Mood normal.            ED Course     Labs Reviewed   COMP METABOLIC PANEL (14) - Abnormal; Notable  for the following components:       Result Value    Calcium, Total 10.6 (*)     AST 36 (*)     ALT 56 (*)     Bilirubin, Total 0.2 (*)     Total Protein 8.3 (*)     Albumin 5.3 (*)     All other components within normal limits   CBC WITH DIFFERENTIAL WITH PLATELET - Abnormal; Notable for the following components:    WBC 11.4 (*)     Lymphocyte Absolute 4.01 (*)     All other components within normal limits   LIPASE - Normal   HCG, BETA SUBUNIT, QUAL - Normal   URINALYSIS WITH CULTURE REFLEX   RAINBOW DRAW LAVENDER   RAINBOW DRAW LIGHT GREEN   RAINBOW DRAW BLUE             CT ABDOMEN+PELVIS(CONTRAST ONLY)(CPT=74177)    Result Date: 9/6/2024  PROCEDURE:  CT ABDOMEN+PELVIS (CONTRAST ONLY) (CPT=74177)  COMPARISON:  [facility] , CT, CT ABDOMEN+PELVIS(CONTRAST ONLY)(CPT=74177), 5/29/2024, 8:18 PM.  INDICATIONS:  Abd pain, possible hernia from PMD office.  TECHNIQUE:  CT scanning was performed from the dome of the diaphragm to the pubic symphysis with non-ionic intravenous contrast material. Post contrast coronal MPR imaging was performed.  Dose reduction techniques were used. Dose information is transmitted to the ACR (American College of Radiology) NRDR (National Radiology Data Registry) which includes the Dose Index Registry.  PATIENT STATED HISTORY:(As transcribed by Technologist)  Patient reports general abdominal pain beginning several days ago. Patient denies nausea/vomiting/urinary symptoms   CONTRAST USED:  100cc of Isovue 370  FINDINGS:  LIVER:  No enlargement, atrophy, abnormal density, or significant focal lesion.  BILIARY:  No visible dilatation or calcification.  PANCREAS:  No lesion, fluid collection, ductal dilatation, or atrophy.  SPLEEN:  No enlargement or focal lesion.  KIDNEYS:  No mass, obstruction, or calcification.  ADRENALS:  No mass or enlargement.  AORTA/VASCULAR:  No aneurysm or dissection.  RETROPERITONEUM:  No mass or adenopathy.  BOWEL/MESENTERY:  No dilated loops of small bowel are seen.   Portions of the bowel are decompressed, limiting evaluation.  There is a normal-appearing appendix.  Lack of oral contrast somewhat limits assessment. ABDOMINAL WALL:  There is a tiny umbilical hernia containing fat. URINARY BLADDER:  No visible focal wall thickening, lesion, or calculus.  PELVIC NODES:  No adenopathy.  PELVIC ORGANS:  No visible mass.  Pelvic organs appropriate for patient age.  BONES:  No bony lesion or fracture.  LUNG BASES:  No visible pulmonary or pleural disease.  OTHER:  Negative.             CONCLUSION:  There is a tiny umbilical hernia containing fat.  No evidence of obstruction.  No free fluid is seen within the abdomen or pelvis.  If clinical symptoms persist then recommend follow-up imaging.   LOCATION:  [id number]   Dictated by (CST): Stewart Ibarra MD on 9/06/2024 at 9:43 PM     Finalized by (CST): Stewart Ibarra MD on 9/06/2024 at 9:50 PM               MDM      Differential Diagnosis  43-year-old female presents today for evaluation of periumbilical and left lower quadrant pain.  On a CT scan in May, patient had a fat-containing umbilical hernia.  She does have left lower quadrant umbilical tenderness without guarding or rigidity.  Differential would include strangulated fat-containing hernia, diverticulitis, intra-abdominal abscess, bowel perforation, bowel obstruction.  Plan for CT along with labs.  Will reassess    9:55 pm  Patient's laboratory workup and imaging did not demonstrate acute abnormality.  On reassessment, patient remains well-appearing.  I reviewed the results with patient.  We discussed her elevated LFTs, she states she has known history of fatty liver.  With her reassuring workup and good clinical appearance, I feel she stable for discharge home.  She states he has an appointment in place with surgery on Monday.  I advised that she follow-up as planned and return for any worsening symptoms.    External Chart Reviewed  I reviewed the CT results from May 29 of this  year                                   Medical Decision Making      Disposition and Plan     Clinical Impression:  1. Abdominal pain of unknown etiology    2. Umbilical hernia without obstruction and without gangrene         Disposition:  Discharge  9/6/2024  9:57 pm    Follow-up:  Addie Spann MD  [address] [address]  [phone]    Call in 1 day(s)            Medications Prescribed:  Current Discharge Medication List

## 2024-09-07 NOTE — DISCHARGE INSTRUCTIONS
Follow-up with his surgeon within the next week for their evaluation.  Return for any new or worsening pain, vomiting, fevers or any other concerning symptoms.

## 2024-10-21 ENCOUNTER — APPOINTMENT (OUTPATIENT)
Dept: GENERAL RADIOLOGY | Age: 44
End: 2024-10-21
Attending: EMERGENCY MEDICINE
Payer: COMMERCIAL

## 2024-10-21 ENCOUNTER — HOSPITAL ENCOUNTER (EMERGENCY)
Age: 44
Discharge: HOME | End: 2024-10-21
Attending: EMERGENCY MEDICINE
Payer: COMMERCIAL

## 2024-10-21 VITALS
DIASTOLIC BLOOD PRESSURE: 75 MMHG | HEART RATE: 73 BPM | TEMPERATURE: 99 F | RESPIRATION RATE: 16 BRPM | HEIGHT: 66 IN | OXYGEN SATURATION: 100 % | SYSTOLIC BLOOD PRESSURE: 109 MMHG | WEIGHT: 182 LBS | BODY MASS INDEX: 29.25 KG/M2

## 2024-10-21 DIAGNOSIS — E87.6 HYPOKALEMIA: Primary | ICD-10-CM

## 2024-10-21 DIAGNOSIS — R00.2 PALPITATIONS: ICD-10-CM

## 2024-10-21 DIAGNOSIS — S16.1XXA STRAIN OF NECK MUSCLE, INITIAL ENCOUNTER: ICD-10-CM

## 2024-10-21 DIAGNOSIS — R06.02 SHORTNESS OF BREATH: ICD-10-CM

## 2024-10-21 LAB
ALBUMIN SERPL-MCNC: 3.5 G/DL (ref 3.4–5)
ALBUMIN/GLOB SERPL: 0.9 {RATIO} (ref 1–2)
ALP LIVER SERPL-CCNC: 85 U/L
ALT SERPL-CCNC: 47 U/L
ANION GAP SERPL CALC-SCNC: 6 MMOL/L (ref 0–18)
AST SERPL-CCNC: 18 U/L (ref 15–37)
ATRIAL RATE: 92 BPM
BASOPHILS # BLD AUTO: 0.03 X10(3) UL (ref 0–0.2)
BASOPHILS NFR BLD AUTO: 0.3 %
BILIRUB SERPL-MCNC: 0.2 MG/DL (ref 0.1–2)
BUN BLD-MCNC: 11 MG/DL (ref 9–23)
CALCIUM BLD-MCNC: 9.4 MG/DL (ref 8.5–10.1)
CHLORIDE SERPL-SCNC: 106 MMOL/L (ref 98–112)
CO2 SERPL-SCNC: 25 MMOL/L (ref 21–32)
CREAT BLD-MCNC: 0.83 MG/DL
D DIMER PPP FEU-MCNC: 0.42 UG/ML FEU (ref ?–0.5)
EGFRCR SERPLBLD CKD-EPI 2021: 90 ML/MIN/1.73M2 (ref 60–?)
EOSINOPHIL # BLD AUTO: 0.14 X10(3) UL (ref 0–0.7)
EOSINOPHIL NFR BLD AUTO: 1.5 %
ERYTHROCYTE [DISTWIDTH] IN BLOOD BY AUTOMATED COUNT: 12.7 %
ERYTHROCYTE [SEDIMENTATION RATE] IN BLOOD: 15 MM/HR
GLOBULIN PLAS-MCNC: 3.8 G/DL (ref 2.8–4.4)
GLUCOSE BLD-MCNC: 135 MG/DL (ref 70–99)
HCT VFR BLD AUTO: 40.9 %
HGB BLD-MCNC: 13.9 G/DL
IMM GRANULOCYTES # BLD AUTO: 0.03 X10(3) UL (ref 0–1)
IMM GRANULOCYTES NFR BLD: 0.3 %
LYMPHOCYTES # BLD AUTO: 2.91 X10(3) UL (ref 1–4)
LYMPHOCYTES NFR BLD AUTO: 32 %
MCH RBC QN AUTO: 30.7 PG (ref 26–34)
MCHC RBC AUTO-ENTMCNC: 34 G/DL (ref 31–37)
MCV RBC AUTO: 90.3 FL
MONOCYTES # BLD AUTO: 0.48 X10(3) UL (ref 0.1–1)
MONOCYTES NFR BLD AUTO: 5.3 %
NEUTROPHILS # BLD AUTO: 5.5 X10 (3) UL (ref 1.5–7.7)
NEUTROPHILS # BLD AUTO: 5.5 X10(3) UL (ref 1.5–7.7)
NEUTROPHILS NFR BLD AUTO: 60.6 %
OSMOLALITY SERPL CALC.SUM OF ELEC: 285 MOSM/KG (ref 275–295)
P AXIS: 76 DEGREES
P-R INTERVAL: 124 MS
PLATELET # BLD AUTO: 366 10(3)UL (ref 150–450)
POTASSIUM SERPL-SCNC: 3.4 MMOL/L (ref 3.5–5.1)
PROT SERPL-MCNC: 7.3 G/DL (ref 6.4–8.2)
Q-T INTERVAL: 340 MS
QRS DURATION: 92 MS
QTC CALCULATION (BEZET): 420 MS
R AXIS: 45 DEGREES
RBC # BLD AUTO: 4.53 X10(6)UL
SODIUM SERPL-SCNC: 137 MMOL/L (ref 136–145)
T AXIS: 46 DEGREES
TROPONIN I SERPL HS-MCNC: <3 NG/L
TSI SER-ACNC: 1.8 MIU/ML (ref 0.55–4.78)
VENTRICULAR RATE: 92 BPM
WBC # BLD AUTO: 9.1 X10(3) UL (ref 4–11)

## 2024-10-21 PROCEDURE — 93005 ELECTROCARDIOGRAM TRACING: CPT

## 2024-10-21 PROCEDURE — 93010 ELECTROCARDIOGRAM REPORT: CPT

## 2024-10-21 PROCEDURE — 99284 EMERGENCY DEPT VISIT MOD MDM: CPT

## 2024-10-21 PROCEDURE — 36415 COLL VENOUS BLD VENIPUNCTURE: CPT

## 2024-10-21 PROCEDURE — 71046 X-RAY EXAM CHEST 2 VIEWS: CPT | Performed by: EMERGENCY MEDICINE

## 2024-10-21 PROCEDURE — 85379 FIBRIN DEGRADATION QUANT: CPT | Performed by: EMERGENCY MEDICINE

## 2024-10-21 PROCEDURE — 99285 EMERGENCY DEPT VISIT HI MDM: CPT

## 2024-10-21 PROCEDURE — 85025 COMPLETE CBC W/AUTO DIFF WBC: CPT | Performed by: EMERGENCY MEDICINE

## 2024-10-21 PROCEDURE — 84484 ASSAY OF TROPONIN QUANT: CPT | Performed by: EMERGENCY MEDICINE

## 2024-10-21 PROCEDURE — 84443 ASSAY THYROID STIM HORMONE: CPT | Performed by: EMERGENCY MEDICINE

## 2024-10-21 PROCEDURE — 80053 COMPREHEN METABOLIC PANEL: CPT | Performed by: EMERGENCY MEDICINE

## 2024-10-21 PROCEDURE — 85652 RBC SED RATE AUTOMATED: CPT | Performed by: EMERGENCY MEDICINE

## 2024-10-21 RX ORDER — POTASSIUM CHLORIDE 1.5 G/1.58G
20 POWDER, FOR SOLUTION ORAL DAILY
Qty: 5 PACKET | Refills: 0 | Status: SHIPPED | OUTPATIENT
Start: 2024-10-21 | End: 2024-10-26

## 2024-10-21 RX ORDER — POTASSIUM CHLORIDE 1500 MG/1
40 TABLET, EXTENDED RELEASE ORAL ONCE
Status: COMPLETED | OUTPATIENT
Start: 2024-10-21 | End: 2024-10-21

## 2024-10-21 RX ORDER — CYCLOBENZAPRINE HCL 10 MG
10 TABLET ORAL 3 TIMES DAILY PRN
Qty: 20 TABLET | Refills: 0 | Status: SHIPPED | OUTPATIENT
Start: 2024-10-21 | End: 2024-10-28

## 2024-10-21 RX ORDER — NAPROXEN 500 MG/1
500 TABLET ORAL 2 TIMES DAILY PRN
Qty: 20 TABLET | Refills: 0 | Status: SHIPPED | OUTPATIENT
Start: 2024-10-21

## 2024-10-21 NOTE — ED PROVIDER NOTES
Patient Seen in: [facility] In [place]      History     Chief Complaint   Patient presents with    Chest Pain Angina    Difficulty Breathing     Stated Complaint: Chest pain/SOB x 5 days    Subjective:   HPI      43-year-old female presents to the emergency department with a variety of different issues.  Primarily she has been having issues with feeling shortness of breath particularly on the left side of her chest.  She feels numbness feels gurgly at times.  She states that it is fairly constant.  No fevers or chills.  She states that she has had some issues intermittently for a while she is never had a PE but is factor V Leiden.  She states that she is being evaluated for possible thoracic outlet syndrome.  She also complains that she has some tightness that goes up into her neck and into her arm.  She is right-hand dominant.  She currently is not working and denies any unusual heavy lifting pushing pulling although she does do normal housework activities and does do some work in the garden.  No recent travel.  No cramping in her legs.  No new medications.  Has had some issues with palpitations and this is not new she did see cardiology back in September.  At that time she also had a calcium score that was 0.  Per her records she had other workup including an echocardiogram in 2022 and a stress echo in 2023 that were both unremarkable.  That she has also had some issues with breast implants that were removed in 2022 and there is some concern that she might have some scar tissue.    Objective:     Past Medical History:    Abdominal pain    Anxiety    Arthritis    Atypical mole    Back pain    Bad breath    Belching    Bleeding nose    Bloating    Body piercing    Change in hair    Chest pain    Chronic cough    Decorative tattoo    Diabetes mellitus (HCC)    Diarrhea, unspecified    Dizziness    Factor V Leiden (HCC)    Fatigue    Flatulence/gas pain/belching    Hearing loss    Heart  palpitations    Heartburn    History of eating disorder    Hoarseness, chronic    Indigestion    Leaking of urine    Leg swelling    Nausea    Osteoporosis    Pain in joints    POTS (postural orthostatic tachycardia syndrome)    Problems with swallowing    Seizures (HCC)    Skin blushing/flushing    Sleep disturbance    Sputum production    Stress    Uncomfortable fullness after meals    Weight gain    Wheezing              Past Surgical History:   Procedure Laterality Date    Breast surgery      Colposcopy,bx cervix/endocerv curr      multiple    D & c      Egd      Excisional biopsy left  2021    Fibroadenoma    Fracture surgery      R. leg    Implant left      Saline    Implant right      Saline    Other surgical history      Breast Augmentation                Social History     Socioeconomic History    Marital status:    Tobacco Use    Smoking status: Former     Current packs/day: 0.00     Average packs/day: 1 pack/day for 28.0 years (28.0 ttl pk-yrs)     Types: Cigarettes     Start date: 1995     Quit date: 2023     Years since quittin.1    Smokeless tobacco: Never   Vaping Use    Vaping status: Never Used   Substance and Sexual Activity    Alcohol use: Never    Drug use: Never   Other Topics Concern    Caffeine Concern Yes     Comment: coffee 1 cups                  Physical Exam     ED Triage Vitals [10/21/24 0909]   /84   Pulse 95   Resp 14   Temp 98.6 °F (37 °C)   Temp src Temporal   SpO2 100 %   O2 Device None (Room air)       Current Vitals:   Vital Signs  BP: 109/75  Pulse: 73  Resp: 16  Temp: 98.6 °F (37 °C)  Temp src: Temporal    Oxygen Therapy  SpO2: 100 %  O2 Device: None (Room air)        Physical Exam  Vitals and nursing note reviewed.   Constitutional:       Appearance: She is well-developed.   HENT:      Head: Normocephalic and atraumatic.   Cardiovascular:      Rate and Rhythm: Normal rate and regular rhythm.      Heart sounds: Normal heart sounds.    Pulmonary:      Effort: Pulmonary effort is normal.      Breath sounds: Normal breath sounds. No stridor. No wheezing.   Abdominal:      General: Bowel sounds are normal.      Palpations: Abdomen is soft.      Tenderness: There is no abdominal tenderness. There is no rebound.   Musculoskeletal:         General: Normal range of motion.      Cervical back: Normal range of motion and neck supple. Tenderness present.   Lymphadenopathy:      Cervical: No cervical adenopathy.   Skin:     General: Skin is warm and dry.      Coloration: Skin is not pale.   Neurological:      General: No focal deficit present.      Mental Status: She is alert and oriented to person, place, and time.      Cranial Nerves: No cranial nerve deficit.      Coordination: Coordination normal.   Psychiatric:         Attention and Perception: Attention normal.         Mood and Affect: Mood is anxious.         Behavior: Behavior is cooperative.         Thought Content: Thought content does not include homicidal or suicidal plan.            ED Course     Labs Reviewed   COMP METABOLIC PANEL (14) - Abnormal; Notable for the following components:       Result Value    Glucose 135 (*)     Potassium 3.4 (*)     A/G Ratio 0.9 (*)     All other components within normal limits   TROPONIN I HIGH SENSITIVITY - Normal   D-DIMER - Normal   SED RATE, WESTERGREN (AUTOMATED) - Normal   CBC WITH DIFFERENTIAL WITH PLATELET   TSH W REFLEX TO FREE T4   RAINBOW DRAW LAVENDER   RAINBOW DRAW LIGHT GREEN   RAINBOW DRAW BLUE     EKG    Rate, intervals and axes as noted on EKG Report.  Rate: 92  Rhythm: Sinus Rhythm  Reading: No acute ST segment change                XR CHEST PA + LAT CHEST (CPT=71046)    Result Date: 10/21/2024  PROCEDURE:  XR CHEST PA + LAT CHEST (CPT=71046)  INDICATIONS:  Chest pain/SOB x 5 days  COMPARISON:  [facility], XR, XR CHEST AP PORTABLE  (CPT=71045), 7/22/2023, 1:56 PM.  TECHNIQUE:  PA and lateral chest radiographs were obtained.  PATIENT STATED  HISTORY: (As transcribed by Technologist)  Patient has chest pain X 5 days. Pain is on left side and radiates under breast to armpit and up towards shoulder. She also states shortness of breath and a cough. Patient woke up with a flutter, pulsing feeling on left side of her neck. Patient has history of asthma and hypertension. Patient had breast implant removal surgery X 2 years ago.    FINDINGS:  Heart size is within normal limits.  Pleural spaces appear clear.  Mediastinal and hilar contours are normal.  No focal consolidation.  If there is persistent clinical concern then recommend follow-up imaging.            CONCLUSION:  See above.   LOCATION:  [facility]   Dictated by (CST): Stewart Ibarra MD on 10/21/2024 at 10:11 AM     Finalized by (CST): Stewart Ibarra MD on 10/21/2024 at 10:12 AM            MDM      Patient had IV established and blood work obtained.  She was found to have some hypokalemia and this was initially corrected in the emergency department.  She had a chest x-ray that personally reviewed there is no evidence of pneumothorax pleural effusion or consolidation to give her shortness of breath or chest pain.  Patient's cardiac workup is negative this coupled with her known history of a low calcium score feel that it is very unlikely for her to be cardiac.  She had a negative D-dimer and no other significant risk factors and O2 saturation 100% do not feel it is consistent with pulmonary embolism.  She does have some musculoskeletal components of her exam and history and this could certainly be an etiology.  We also discussed that could be related to some underlying stressors as well.  We will correct her potassium and give her medications for musculoskeletal issues and she is to follow-up with her primary care physician.  Patient was discharged in good condition        Medical Decision Making      Disposition and Plan     Clinical Impression:  1. Hypokalemia    2. Shortness of breath    3. Palpitations    4.  Strain of neck muscle, initial encounter         Disposition:  Discharge  10/21/2024 12:12 pm    Follow-up:  Virgilio Angulo PA  [address] [address] [phone]    Schedule an appointment as soon as possible for a visit            Medications Prescribed:  Discharge Medication List as of 10/21/2024 12:25 PM        START taking these medications    Details   potassium chloride 20 MEQ Oral Powd Pack Take 20 mEq by mouth daily for 5 days., Normal, Disp-5 packet, R-0      naproxen 500 MG Oral Tab Take 1 tablet (500 mg total) by mouth 2 (two) times daily as needed., Normal, Disp-20 tablet, R-0      cyclobenzaprine 10 MG Oral Tab Take 1 tablet (10 mg total) by mouth 3 (three) times daily as needed for Muscle spasms., Normal, Disp-20 tablet, R-0                 Supplementary Documentation:

## 2024-10-21 NOTE — ED INITIAL ASSESSMENT (HPI)
Pt reports lt sided chest pain and SOB for the past 4-5 days pt reports feeling confused at times. States chest pain and SOB is worse with exertion and feels palpitations when she lays on her rt side.

## 2024-11-14 DIAGNOSIS — R94.39 EQUIVOCAL STRESS TEST: ICD-10-CM

## 2024-11-14 DIAGNOSIS — R07.9 CHEST PAIN ON EXERTION: Primary | ICD-10-CM

## 2024-11-18 ENCOUNTER — LAB ENCOUNTER (OUTPATIENT)
Dept: LAB | Age: 44
End: 2024-11-18
Attending: INTERNAL MEDICINE
Payer: COMMERCIAL

## 2024-11-18 DIAGNOSIS — R00.2 PALPITATIONS: ICD-10-CM

## 2024-11-18 DIAGNOSIS — R42 LIGHTHEADEDNESS: Primary | ICD-10-CM

## 2024-11-18 DIAGNOSIS — R42 DIZZINESS: ICD-10-CM

## 2024-11-18 LAB
ALBUMIN SERPL-MCNC: 4.3 G/DL (ref 3.2–4.8)
ALBUMIN/GLOB SERPL: 1.3 {RATIO} (ref 1–2)
ALP LIVER SERPL-CCNC: 74 U/L
ALT SERPL-CCNC: 36 U/L
ANION GAP SERPL CALC-SCNC: 4 MMOL/L (ref 0–18)
AST SERPL-CCNC: 26 U/L (ref ?–34)
BILIRUB SERPL-MCNC: 0.3 MG/DL (ref 0.3–1.2)
BUN BLD-MCNC: 11 MG/DL (ref 9–23)
CALCIUM BLD-MCNC: 10 MG/DL (ref 8.7–10.4)
CHLORIDE SERPL-SCNC: 109 MMOL/L (ref 98–112)
CHOLEST SERPL-MCNC: 181 MG/DL (ref ?–200)
CO2 SERPL-SCNC: 26 MMOL/L (ref 21–32)
CREAT BLD-MCNC: 0.76 MG/DL
EGFRCR SERPLBLD CKD-EPI 2021: 100 ML/MIN/1.73M2 (ref 60–?)
EST. AVERAGE GLUCOSE BLD GHB EST-MCNC: 117 MG/DL (ref 68–126)
FASTING PATIENT LIPID ANSWER: YES
FASTING STATUS PATIENT QL REPORTED: YES
GLOBULIN PLAS-MCNC: 3.3 G/DL (ref 2–3.5)
GLUCOSE BLD-MCNC: 92 MG/DL (ref 70–99)
HBA1C MFR BLD: 5.7 % (ref ?–5.7)
HDLC SERPL-MCNC: 35 MG/DL (ref 40–59)
LDLC SERPL CALC-MCNC: 109 MG/DL (ref ?–100)
MAGNESIUM SERPL-MCNC: 1.9 MG/DL (ref 1.6–2.6)
NONHDLC SERPL-MCNC: 146 MG/DL (ref ?–130)
OSMOLALITY SERPL CALC.SUM OF ELEC: 287 MOSM/KG (ref 275–295)
POTASSIUM SERPL-SCNC: 4.1 MMOL/L (ref 3.5–5.1)
PROT SERPL-MCNC: 7.6 G/DL (ref 5.7–8.2)
SODIUM SERPL-SCNC: 139 MMOL/L (ref 136–145)
TRIGL SERPL-MCNC: 212 MG/DL (ref 30–149)
VLDLC SERPL CALC-MCNC: 36 MG/DL (ref 0–30)

## 2024-11-18 PROCEDURE — 83735 ASSAY OF MAGNESIUM: CPT

## 2024-11-18 PROCEDURE — 83036 HEMOGLOBIN GLYCOSYLATED A1C: CPT

## 2024-11-18 PROCEDURE — 80053 COMPREHEN METABOLIC PANEL: CPT

## 2024-11-18 PROCEDURE — 36415 COLL VENOUS BLD VENIPUNCTURE: CPT

## 2024-11-18 PROCEDURE — 80061 LIPID PANEL: CPT

## 2025-01-30 PROBLEM — R92.0 BREAST MICROCALCIFICATIONS: Status: ACTIVE | Noted: 2024-04-04

## 2025-01-30 PROBLEM — N64.4 BREAST PAIN, LEFT: Status: ACTIVE | Noted: 2024-04-04

## 2025-01-30 PROBLEM — N96 HISTORY OF RECURRENT MISCARRIAGES: Status: ACTIVE | Noted: 2023-08-03

## 2025-05-20 ENCOUNTER — APPOINTMENT (OUTPATIENT)
Dept: ADMINISTRATIVE | Facility: HOSPITAL | Age: 45
End: 2025-05-20
Payer: COMMERCIAL

## 2025-05-20 RX ORDER — FAMOTIDINE 10 MG
10 TABLET ORAL EVERY MORNING
Status: ON HOLD | COMMUNITY
End: 2025-06-02

## 2025-05-20 RX ORDER — MULTIVITAMIN
1 TABLET ORAL DAILY
COMMUNITY

## 2025-05-22 ENCOUNTER — OFFICE VISIT (OUTPATIENT)
Dept: RHEUMATOLOGY | Facility: CLINIC | Age: 45
End: 2025-05-22
Payer: COMMERCIAL

## 2025-05-22 VITALS
SYSTOLIC BLOOD PRESSURE: 124 MMHG | BODY MASS INDEX: 29.38 KG/M2 | TEMPERATURE: 97 F | HEART RATE: 89 BPM | RESPIRATION RATE: 16 BRPM | OXYGEN SATURATION: 94 % | DIASTOLIC BLOOD PRESSURE: 62 MMHG | HEIGHT: 66 IN | WEIGHT: 182.81 LBS

## 2025-05-22 DIAGNOSIS — R42 DIZZINESS: ICD-10-CM

## 2025-05-22 DIAGNOSIS — N96 HISTORY OF MULTIPLE MISCARRIAGES: ICD-10-CM

## 2025-05-22 DIAGNOSIS — N64.4 BREAST PAIN, LEFT: ICD-10-CM

## 2025-05-22 DIAGNOSIS — R23.2 FLUSHING: ICD-10-CM

## 2025-05-22 DIAGNOSIS — L50.9 URTICARIA: Primary | ICD-10-CM

## 2025-05-22 DIAGNOSIS — R23.2 SKIN BLUSHING/FLUSHING: ICD-10-CM

## 2025-05-22 PROCEDURE — 99205 OFFICE O/P NEW HI 60 MIN: CPT | Performed by: INTERNAL MEDICINE

## 2025-05-22 NOTE — PATIENT INSTRUCTIONS
Plan-do lab test to look for autoimmune disease.  Antibodies panel has been ordered which includes tests for lupus, rheumatoid arthritis, autoimmune thyroid disease, antiphospholipid antibody syndrome.  Also check sed rate, C-reactive protein Lyme Western blot test.  Diagnosis unclear.  Recommend healthy lifestyle.  8 hours of sleep at night.  Well-balanced diet.  Avoid excessive fat.  Regular exercise.  Avoid stress.  Possibility that were dealing with fibromyalgia or posttraumatic stress disorder.  Past history of significant stress,  with multiple sclerosis, family stress, other issues.  Recommend taking a daily Allegra or Zyrtec in view of urticaria.  Consider seeing an allergist because of the urticaria and flushing.  Return to office for recheck 1 month.

## 2025-05-22 NOTE — PROGRESS NOTES
[facility]  Report of Consultation    Molina Sahu Patient Status:  No patient class for patient encounter    1980 MRN [id number]   Location [facility], [facility] PCP DIANA BRIONES PA     Date of Consult:  25    Reason for Consultation:   Chief Complaint   Patient presents with    New Patient     Patient states she has always had neck and joint pain. In 2021 she decided to have her breast implant removed because she had not been feeling well and was wondering if there was a correlation. She states right after she had them removed she became very ill and her symptoms magnified x 5. She has heart palpitations as well as skin flushing. All over joint pain. She has a neck injection scheduled. Poor ROM in shoulders. Lt sided jaw pain that goes to her lt eye and into her head. She has had COVID 4x.        History of Present Illness: Molina Sahu is a a(n) 44 year old female.   C/o gets facial swelling, skin flushing.  Has had adrenal testing and it was ok.    Has had allergy testing negative.  Redness in the cheeks.  Left side of neck will swell.  Has nystagmus left eye.  Occasional urticaria.        Had breast implants removed in .  Thought it would make her feel better..  3-4 weeks after the breast implant removal, actually felt worse.  Thought she was having a sroke. Had flushing, aches, pains.        Saw a specialist.  Neck mri done. Question of C5 disc bulge.  Left breast   Feels uncomfortable and seems to restrict movement in the chest.        Stiff in the morning.  Effort to get going  In the morning.       Has had stomach ulcers from ibuprfen in the past.       Concerned about flushing episodes, swelling of the skin, redness of the skin.  All this occurs off and on.        Has seen dermatologist and allergist without any true diagnosis found.     History:  PMH:       Urticaria                   Facial Flushing   History of COVID 4 times.  Last 1  a couple years ago.  History of multiple miscarriages.    PSH:     2005  Saline Breast Implants.     Breast Implants removal   2024  Hysterectomy   D and C    FAMHX:   Mother  had thyroid removed age 35.    Father diabetic,  of a sarcoma.  SOCHX:   Uemployed, former furniture worker/.   Non smoker   Non drinker.   3 children -  2 daughter , one Daughter 25, has hypothyroid.  Exercise  - walks 3-4 miles a few times a week.  Light weight lifting.  Son age 20, in school, a .     Young daughter age 7.     Allergies: Allergies[1]    Medications: Medications - Current[2]    Review of Systems:   No recent fever or chills. No recent weight loss or weight gain.  After breast implant removal in  gained 20 pounds. .   No current chest pain or shortness of breath. Has had palpitations.  Has seen a cardiologist.   No current abdominal pain, nausea, diarrhea, or vomiting.  No difficulty with urination. No blood in the stool or blood in urine.  No current difficulty with  hearing.  Left eye nystagmus.    Joint pain/joint swelling-see HPI. Occasional  muscle pain. No leg swelling.  Skin - flushing episodes and urticaria episodes.   Had an issue with breast milk production.  Prolactin in normal ramge      Physical Exam:/62   Pulse 89   Temp 97 °F (36.1 °C)   Resp 16   Ht 5' 6\" (1.676 m)   Wt 182 lb 12.8 oz (82.9 kg)   LMP 2024 (Approximate)   SpO2 94%   BMI 29.50 kg/m²    Normal appearing woman.  HEENT exam within normal limits except trace redness of cheeks. . Nonicteric sclera. Conjunctiva normal.    Neck supple without thyromegaly, no lymphadenopathy.   Lungs are clear to auscultation and percussion.    Heart: normal sinus rhythm without murmur.    Abdomen: soft, nontender, no masses, no organomegaly.    Extremities without any cyanosis, clubbing, or edema.  Normal upper and lower extremities.  Skin: trace red cheeks.     Laboratory Data:  25 DHEA sulfa normal  at 148.  Albumin 4.8 testosterone free 3.2 testosterone total 25-normal levels.    3/7/2025 norepinephrine level 321 epinephrine level 33 in normal range.  2025   labs from [facility].  White count 8.3 hemoglobin 14.5 hematocrit 44.1 platelet count 435,000  John-Barr VAC IgG positive sign of past John-Barr virus infection.  Sodium 137 potassium 4.1 chloride 104 BUN 10 creatinine 0.72 GFR greater than 90  Calcium 9.7 glucose 83 total protein 6.9 albumin 4.5 ALT 25 alkaline phos 74 AST 20 bilirubin 0.3 TSH 2.14 ELIZABETH negative rheumatoid factor negative vitamin D  38   sed rate 63.  Iron 92 ferritin 48 TIBC 381 saturation of iron 24%.  Vitamin B12 341.    2025 cortisol level - 1.8.      24 glucose 92 hemoglobin A1c 5.7 sodium 139 potassium 4.1 chloride 100 BUN 11 creatinine 0.76 calcium 10.0  alkaline phosphatase 74 AST 26 ALT 36  Total bilirubin 0.3 globulin 3.3 magnesium 1.9 cholesterol 181 triglyceride 212 HDL 35  total protein 7.6 albumin 4.3     10/21/24  ESR   15     2024 prolactin level total low at 1.2.       Imagin2025 unremarkable MRA of head done at [facility].    [id number] scan of chest no radiographic evidence of acute disease.  A 4 mm right upper lobe nodule noted.    2024 CT scan of abdomen and pelvis at [facility]-conclusion tiny umbilical hernia containing fat.  No evidence of obstruction.  No fluid is seen within the abdomen or pelvis.    Impression:   44-year-old woman with a variety of complaints.  Episodes of facial and body flushing, episodes of redness of the skin and urticaria, episodes of not feeling well.  History of COVID 4 times.  History of miscarriages in the past.  Could possibly have a autoimmune disease, could possibly have antiphospholipid antibody syndrome.  Might have post-COVID syndrome.  Fibromyalgia is also a consideration.  Diagnosis unclear.    1. Urticaria    2. Dizziness    3. Flushing    4. History of multiple  miscarriages    5. Breast pain, left    6. Skin blushing/flushing        Recommendations:   Patient Instructions   Plan-do lab test to look for autoimmune disease.  Antibodies panel has been ordered which includes tests for lupus, rheumatoid arthritis, autoimmune thyroid disease, antiphospholipid antibody syndrome.  Also check sed rate, C-reactive protein Lyme Western blot test.  Diagnosis unclear.  Recommend healthy lifestyle.  8 hours of sleep at night.  Well-balanced diet.  Avoid excessive fat.  Regular exercise.  Avoid stress.  Possibility that were dealing with fibromyalgia or posttraumatic stress disorder.  Past history of significant stress,  with multiple sclerosis, family stress, other issues.  Recommend taking a daily Allegra or Zyrtec in view of urticaria.  Consider seeing an allergist because of the urticaria and flushing.  Return to office for recheck 1 month.         Thank you for allowing me to participate in the care of your patient.    Adelso Rehman MD  5/22/2025  1:32 PM         [1]   Allergies  Allergen Reactions    Codeine NAUSEA ONLY and NAUSEA AND VOMITING   [2]   Current Outpatient Medications:     famotidine 10 MG Oral Tab, Take 1 tablet (10 mg total) by mouth every morning., Disp: , Rfl:     Multiple Vitamins Essential Oral Tab, Take 1 tablet by mouth daily., Disp: , Rfl:     Turmeric (QC TUMERIC COMPLEX OR), Take by mouth as needed., Disp: , Rfl:     OIL OF OREGANO OR, Take by mouth as needed., Disp: , Rfl:     MAGNESIUM OR, Take by mouth as needed., Disp: , Rfl:     meclizine 25 MG Oral Tab, Take 1 tablet (25 mg total) by mouth 3 (three) times daily as needed for Dizziness., Disp: 30 tablet, Rfl: 0    albuterol 108 (90 Base) MCG/ACT Inhalation Aero Soln, Inhale 2 puffs into the lungs every 4 (four) hours., Disp: , Rfl:     PEG 3350-KCl-Na Bicarb-NaCl 420 g Oral Recon Soln, Take as directed., Disp: 1 each, Rfl: 0

## 2025-05-29 ENCOUNTER — TELEPHONE (OUTPATIENT)
Facility: CLINIC | Age: 45
End: 2025-05-29

## 2025-05-29 NOTE — TELEPHONE ENCOUNTER
Patient called office in regards to questions regarding her blood work. Questions answered and voiced understanding

## 2025-05-30 ENCOUNTER — PATIENT MESSAGE (OUTPATIENT)
Dept: RHEUMATOLOGY | Facility: CLINIC | Age: 45
End: 2025-05-30

## 2025-05-30 ENCOUNTER — LAB ENCOUNTER (OUTPATIENT)
Dept: LAB | Age: 45
End: 2025-05-30
Attending: INTERNAL MEDICINE
Payer: COMMERCIAL

## 2025-05-30 DIAGNOSIS — R42 DIZZINESS: ICD-10-CM

## 2025-05-30 DIAGNOSIS — R23.2 FLUSHING: ICD-10-CM

## 2025-05-30 DIAGNOSIS — L50.9 URTICARIA: Primary | ICD-10-CM

## 2025-05-30 LAB
CRP SERPL-MCNC: 0.8 MG/DL (ref ?–0.5)
ERYTHROCYTE [SEDIMENTATION RATE] IN BLOOD: 25 MM/HR (ref 0–20)

## 2025-05-30 PROCEDURE — 86617 LYME DISEASE ANTIBODY: CPT

## 2025-05-30 PROCEDURE — 85652 RBC SED RATE AUTOMATED: CPT

## 2025-05-30 PROCEDURE — 36415 COLL VENOUS BLD VENIPUNCTURE: CPT

## 2025-05-30 PROCEDURE — 86140 C-REACTIVE PROTEIN: CPT

## 2025-06-02 ENCOUNTER — HOSPITAL ENCOUNTER (OUTPATIENT)
Facility: HOSPITAL | Age: 45
Setting detail: HOSPITAL OUTPATIENT SURGERY
Discharge: HOME | End: 2025-06-02
Attending: STUDENT IN AN ORGANIZED HEALTH CARE EDUCATION/TRAINING PROGRAM | Admitting: STUDENT IN AN ORGANIZED HEALTH CARE EDUCATION/TRAINING PROGRAM
Payer: COMMERCIAL

## 2025-06-02 ENCOUNTER — ANESTHESIA (OUTPATIENT)
Dept: ENDOSCOPY | Facility: HOSPITAL | Age: 45
End: 2025-06-02
Payer: COMMERCIAL

## 2025-06-02 ENCOUNTER — ANESTHESIA EVENT (OUTPATIENT)
Dept: ENDOSCOPY | Facility: HOSPITAL | Age: 45
End: 2025-06-02
Payer: COMMERCIAL

## 2025-06-02 VITALS
BODY MASS INDEX: 28.77 KG/M2 | RESPIRATION RATE: 16 BRPM | HEART RATE: 65 BPM | SYSTOLIC BLOOD PRESSURE: 115 MMHG | DIASTOLIC BLOOD PRESSURE: 62 MMHG | TEMPERATURE: 98 F | HEIGHT: 66 IN | OXYGEN SATURATION: 98 % | WEIGHT: 179 LBS

## 2025-06-02 DIAGNOSIS — R10.32 LEFT LOWER QUADRANT PAIN: ICD-10-CM

## 2025-06-02 DIAGNOSIS — R13.10 DYSPHAGIA, UNSPECIFIED TYPE: ICD-10-CM

## 2025-06-02 DIAGNOSIS — K76.0 FATTY LIVER: ICD-10-CM

## 2025-06-02 DIAGNOSIS — R74.8 ELEVATED LIVER ENZYMES: ICD-10-CM

## 2025-06-02 DIAGNOSIS — R19.5 CHANGE IN STOOL CALIBER: ICD-10-CM

## 2025-06-02 DIAGNOSIS — R10.13 EPIGASTRIC PAIN: ICD-10-CM

## 2025-06-02 DIAGNOSIS — R12 HEARTBURN: ICD-10-CM

## 2025-06-02 DIAGNOSIS — K25.9 GASTRIC ULCER WITHOUT HEMORRHAGE OR PERFORATION, UNSPECIFIED CHRONICITY: ICD-10-CM

## 2025-06-02 PROCEDURE — 88342 IMHCHEM/IMCYTCHM 1ST ANTB: CPT | Performed by: STUDENT IN AN ORGANIZED HEALTH CARE EDUCATION/TRAINING PROGRAM

## 2025-06-02 PROCEDURE — 88341 IMHCHEM/IMCYTCHM EA ADD ANTB: CPT | Performed by: STUDENT IN AN ORGANIZED HEALTH CARE EDUCATION/TRAINING PROGRAM

## 2025-06-02 PROCEDURE — 88305 TISSUE EXAM BY PATHOLOGIST: CPT | Performed by: STUDENT IN AN ORGANIZED HEALTH CARE EDUCATION/TRAINING PROGRAM

## 2025-06-02 RX ORDER — LIDOCAINE HYDROCHLORIDE 10 MG/ML
INJECTION, SOLUTION EPIDURAL; INFILTRATION; INTRACAUDAL; PERINEURAL AS NEEDED
Status: DISCONTINUED | OUTPATIENT
Start: 2025-06-02 | End: 2025-06-02 | Stop reason: SURG

## 2025-06-02 RX ORDER — SODIUM CHLORIDE, SODIUM LACTATE, POTASSIUM CHLORIDE, CALCIUM CHLORIDE 600; 310; 30; 20 MG/100ML; MG/100ML; MG/100ML; MG/100ML
INJECTION, SOLUTION INTRAVENOUS CONTINUOUS
Status: DISCONTINUED | OUTPATIENT
Start: 2025-06-02 | End: 2025-06-02

## 2025-06-02 RX ORDER — NALOXONE HYDROCHLORIDE 0.4 MG/ML
0.08 INJECTION, SOLUTION INTRAMUSCULAR; INTRAVENOUS; SUBCUTANEOUS ONCE AS NEEDED
Status: DISCONTINUED | OUTPATIENT
Start: 2025-06-02 | End: 2025-06-02

## 2025-06-02 RX ORDER — ONDANSETRON 2 MG/ML
4 INJECTION INTRAMUSCULAR; INTRAVENOUS ONCE AS NEEDED
Status: DISCONTINUED | OUTPATIENT
Start: 2025-06-02 | End: 2025-06-02

## 2025-06-02 RX ADMIN — SODIUM CHLORIDE, SODIUM LACTATE, POTASSIUM CHLORIDE, CALCIUM CHLORIDE: 600; 310; 30; 20 INJECTION, SOLUTION INTRAVENOUS at 09:53:00

## 2025-06-02 RX ADMIN — LIDOCAINE HYDROCHLORIDE 30 MG: 10 INJECTION, SOLUTION EPIDURAL; INFILTRATION; INTRACAUDAL; PERINEURAL at 09:56:00

## 2025-06-02 RX ADMIN — SODIUM CHLORIDE, SODIUM LACTATE, POTASSIUM CHLORIDE, CALCIUM CHLORIDE: 600; 310; 30; 20 INJECTION, SOLUTION INTRAVENOUS at 10:25:00

## 2025-06-02 NOTE — DISCHARGE INSTRUCTIONS
Home Care Instructions for Colonoscopy and EGD With Sedation    Diet:  - Resume your regular diet as tolerated unless otherwise instructed.  - start with light meals to minimize bloating.  - Do not drink alcohol today.    Medication:  - If you have questions about resuming your normal medications, please contact your Primary Care Physician.    Activities:  - Take it easy today. Do not return to work today.  - Do not drive today.  - Do not operate any machinery today (including kitchen equipment).    Colonoscopy:  - You may notice some rectal \"spotting\" (a little blood on the toilet tissue) for a day or two after the exam. This is normal.  - If you experience any rectal bleeding (not spotting), persistent tenderness or sharp severe abdominal pains, oral temperature over 100 degrees Farenheit, light-headedness or dizziness, or any other problems, contact your doctor.    EGD:  - You may have a sore throat for 2-3 days following the exam. This is normal. Gargling with warm salt water (1/2 tsp salt to 1 glass warm water) or using throat lozenges will help.  - If you experience any sharp pain in your neck, abdomen or chest, vomiting of blood, oral temperature over 100 degrees Farenheit, light-headedness or dizziness, or any other problems, contact your doctor.    **If unable to reach your doctor, please go to the [facility] Emergency Room**    - Your referring physician will receive a full report of your examination.  - If you do not hear from your doctor's office within two weeks of your biopsy, please call them for your results.    Additional Comments/Instructions (if applicable):

## 2025-06-02 NOTE — OPERATIVE REPORT
EGD Operative Report  Patient Name: Molnia Sahu  YOB: 1980  MRN: [id number]  Procedure: Esophagogastroduodenoscopy (EGD)  with biopsies  Pre-operative Diagnosis & Indication:   History of gastric ulcer  Post-operative Diagnosis:  Gastric ulcer x1  Hiatal hernia, 2cm  Attending Endoscopist: Osmani Goodman M.D.  Informed Consent: The planned procedure(s), the explanation of the procedure, its expected benefits, the potential complications and risks and possible alternatives and their benefits and risks were discussed with the patient or the patient's surrogate. The discussion of risks, not limited to but including bleeding, infection, perforation, adverse effects from anesthesia, need for emergency surgery/prolonged hospitalization,  cardiac arrhythmias,  and aspiration were discussed with patient.  Pt and/or surrogate understood the proposed procedure(s), its risks, benefits and alternatives and wish to proceed with procedure(s). All questions answered in full.  After all questions were answered to their satisfaction, a signed, informed, and witnessed consent was obtained.  Physical Exam: Heart: regular rate and rhythm. No rubs, murmurs, or gallops. Lungs: Clear to auscultation bilaterally. Abdomen: Soft, non-tender, non distended. No rebound tenderness, no guarding.   A TIME OUT WAS COMPLETED prior to the procedure to confirm the patient, procedure(s) and complete endoscopy safety procedure.  Sedation: Monitored Anesthesia Care; ASA class per anesthesiology team   Monitoring: Pulsoximetry, pulse, respirations, and blood pressure , vitals were monitored throughout the entire procedure under monitored anesthesia care.   Procedure: The patient was then brought to the endoscopy suite where his/her pulse, pulse oximetry and blood pressure were monitored. The patient was placed in the left lateral decubitus position and deep sedation was administered. Once adequate sedation was achieved, a bite block  was placed and a lubricated tip of an Olympus video upper endoscope was inserted through the oropharynx and gently manipulated through the esophagus into the stomach and the second portion of the duodenum. Upon withdrawal of the endoscope, careful visualization of the mucosa was performed. The endoscope was then withdrawn into the gastric antrum and placed in a retroflexed position.  The endoscope was then righted, and air was suctioned from the stomach.  The endoscope was then withdrawn from the patient, with careful visual inspection of the mucosa. The patient left the procedure room in stable condition for recovery. Findings and endotherapy as listed below  Toleration: Patient tolerated procedure well   Complications: No immediate complications   Technical Difficulty:  The procedure was not technically difficult   Estimated Blood Loss: Minimal, less than 5mL of estimated blood loss.   Findings and Therapeutics:  Esophagus:   Normal mucosa.   No strictures. GE junction traversed with endoscope without resistance.  The Z-line was irregular, appreciated at 38 cm from the incisors.  Diaphragmatic impression noted at 40cm. 2 cm hiatal hernia     Stomach:    One 7mm gastric ulcer with pigment spot in the distal gastric body, without bleeding or visible vessel of clot. Biopsied at ulcer edge with cold forceps, rule out malignancy.   Endoscope was placed in a retroflexion view in the stomach. There was evidence of a hiatal hernia. Biopsies with cold forceps were obtained, rule out H pylori.   Duodenum:   Normal mucosa of examined extent.    Recommendations:  Post EGD precautions, watch for bleeding, infection, perforation, adverse drug reactions   Follow-up biopsies  Pantoprazole 40 mg twice daily x 8 weeks  Repeat EGD in 8 weeks; given prior history, favor with advanced GI, to pursue EUS if persistent gastric ulcers  Avoid non-aspirin NSAIDs  Proceed with colonoscopy today       Osmani Goodman MD  6/2/2025  10:03 AM

## 2025-06-02 NOTE — H&P
[facility] History and Physical Procedure Note    CC: EGD,colonoscopy    History of Present Illness: Molina Sahu is a 44 year old female who presents for a  EGD/colonoscopy.    Indication:   Seen in GI OV 1/30/25  History of gastric ulcers, surveillance  GERD  Hiatal hernia   Abdominal pain   Change in stool caliber  No dysphagia symptoms at this time.      No First Degree Relative with a history of Colorectal Cancer           Medications reviewed as below:   No non-aspirin antithrombotic agents    Medications:  Medications - Current[1]    Past Medical History:  Past Medical History[2]    Past Surgical History:  Past Surgical History[3]    Family History:  Family History[4]    Social History:  Short Social Hx on File[5]    Review of Systems  Negative unless otherwise mentioned in HPI.     Allergies:  Allergies[6]      Objective:    Physical Exam  Ht 5' 6\" (1.676 m)   Wt 179 lb (81.2 kg)   LMP 03/01/2024 (Approximate)   BMI 28.89 kg/m²   Body mass index is 28.89 kg/m².    Gen: Awake and alert, NAD  CV: Ext warm b/l  Resp: no resp distress  Neuro: NAD, Aox3.     Pertinent labs:   Lab Results   Component Value Date     11/18/2024    K 4.1 11/18/2024     11/18/2024    CO2 26.0 11/18/2024    ANIONGAP 4 11/18/2024    BUN 11 11/18/2024     Lab Results   Component Value Date    WBC 9.1 10/21/2024    HGB 13.9 10/21/2024    HCT 40.9 10/21/2024    MCV 90.3 10/21/2024    .0 10/21/2024     Lab Results   Component Value Date    AST 26 11/18/2024    ALT 36 11/18/2024    CRP 0.80 (H) 05/30/2025    ALB 4.3 11/18/2024     No results found for: \"INR\"    Imaging:  XR CHEST PA + LAT CHEST (AKQ=05333)  Narrative: PROCEDURE:  XR CHEST PA + LAT CHEST (CPT=71046)     INDICATIONS:  Chest pain/SOB x 5 days     COMPARISON:  [facility], XR, XR CHEST AP PORTABLE  (CPT=71045), 7/22/2023, 1:56 PM.     TECHNIQUE:  PA and lateral chest radiographs were obtained.     PATIENT STATED HISTORY: (As transcribed  by Technologist)  Patient has chest pain X 5 days. Pain is on left side and radiates under breast to armpit and up towards shoulder. She also states shortness of breath and a cough. Patient woke up with a flutter,   pulsing feeling on left side of her neck. Patient has history of asthma and hypertension. Patient had breast implant removal surgery X 2 years ago.         FINDINGS:    Heart size is within normal limits.  Pleural spaces appear clear.  Mediastinal and hilar contours are normal.  No focal consolidation.  If there is persistent clinical concern then recommend follow-up imaging.                   Impression: CONCLUSION:  See above.        LOCATION:  [place]        Dictated by (CST): Stewart Ibarra MD on 10/21/2024 at 10:11 AM       Finalized by (CST): Stewart Ibarra MD on 10/21/2024 at 10:12 AM          Informed consent  Informed Consent:   The planned procedure(s), the explanation of the procedure, indications, its expected benefits, the potential complications and risks and possible alternatives and their benefits and risks were discussed with the patient. The discussion of risks, not limited to but including bleeding, infection, perforation / tear in the gastrointestinal tract, adverse effects from anesthesia, and possible prolonged hospitalization, emergency surgery, risk of morbidity or mortality, and risk of missed lesion(s) were discussed with patient. Pt understands the missed rate of colonoscopy of polyps, lesions of 5-10% even in the best of circumstances and that although this is an accurate test, there are limitations to colonoscopy.     Patient understood the proposed procedure(s), and informed consented to the procedure and elected to proceed with the procedure(s) with possible intervention (such as polypectomy, biopsy, control of bleeding, etc.) and its risks, benefits and alternatives and wish to proceed with procedure(s). All questions answered in full to patient's  satisfaction in full.        Impression/Recommendations:  Molina Sahu is a 44 year old female who presents for a EGD, Colonoscopy  +/- endotherapy.   Plan to proceed with EGD, Colonoscopy  with anesthesia.     ASA class per anesthesia.   Informed consent obtained as detailed above.       LUIS MIGUEL POPE M.D.  [facility]                     [1] No current outpatient medications on file.  [2]   Past Medical History:   Abdominal pain    Anxiety    Arthritis    Asthma (HCC)    Atypical mole    Back pain    Bad breath    Belching    Bleeding nose    Bloating    Body piercing    Change in hair    Chest pain    Chronic cough    Decorative tattoo    Diarrhea, unspecified    Dizziness    Factor V Leiden (HCC)    Fatigue    Flatulence/gas pain/belching    Hearing loss    Heart palpitations    Heartburn    High cholesterol    History of eating disorder    History of stomach ulcers    gastric ulcer    Hoarseness, chronic    Hx of motion sickness    Indigestion    Leaking of urine    Leg swelling    Nausea    Osteoporosis    Pain in joints    PONV (postoperative nausea and vomiting)    POTS (postural orthostatic tachycardia syndrome)    Problems with swallowing    Seizures (HCC)    Skin blushing/flushing    Sleep disturbance    Sputum production    Stress    Uncomfortable fullness after meals    Weight gain    Wheezing   [3]   Past Surgical History:  Procedure Laterality Date    Breast surgery      Colposcopy,bx cervix/endocerv curr      multiple    D & c      Egd      Excisional biopsy left  04/2021    Fibroadenoma    Fracture surgery      R. leg    Hysterectomy      Implant left  2005    Saline    Implant right  2005    Saline    Other surgical history      Breast Augmentation    Other surgical history Bilateral 2021    breast implants removed   [4]   Family History  Problem Relation Age of Onset    Diabetes Father     Colon Polyps Father     Hypertension Mother     Diabetes Mother     Alcohol and Other Disorders Associated Mother      Ulcerative Colitis Mother     Breast Cancer Maternal Grandmother 48        dx age 48    Breast Cancer Paternal Aunt         unknown   [5]   Social History  Socioeconomic History    Marital status:    Tobacco Use    Smoking status: Former     Current packs/day: 0.00     Average packs/day: 1 pack/day for 28.0 years (28.0 ttl pk-yrs)     Types: Cigarettes     Start date: 1995     Quit date: 2023     Years since quittin.7    Smokeless tobacco: Never   Vaping Use    Vaping status: Some Days    Substances: Nicotine   Substance and Sexual Activity    Alcohol use: Never    Drug use: Never   Other Topics Concern    Caffeine Concern Yes     Comment: coffee 1 cups   [6]   Allergies  Allergen Reactions    Codeine NAUSEA ONLY and NAUSEA AND VOMITING

## 2025-06-02 NOTE — ANESTHESIA POSTPROCEDURE EVALUATION
[facility] Patient Status:  Hospital Outpatient Surgery   Age/Gender 44 year old female MRN [id number]   Location [facility] ENDOSCOPY [facility] Attending Osmani Goodman MD   Hosp Day # 0 PCP DIANA BRIONES PA       Anesthesia Post-op Note    ESOPHAGOGASTRODUODENOSCOPY (EGD) W/ BIOPSIES / COLONOSCOPY W/ COLD SNARE POLYPECTOMY    Procedure Summary       Date: 06/02/25 Room / Location:  ENDOSCOPY 04 / [facility]    Anesthesia Start: 0953 Anesthesia Stop: 1025    Procedures:       ESOPHAGOGASTRODUODENOSCOPY (EGD) W/ BIOPSIES / COLONOSCOPY W/ COLD SNARE POLYPECTOMY      COLONOSCOPY Diagnosis:       Left lower quadrant pain      Epigastric pain      Dysphagia, unspecified type      Heartburn      Change in stool caliber      Fatty liver      Elevated liver enzymes      Gastric ulcer without hemorrhage or perforation, unspecified chronicity      (EGD: GASTRIC ULCER  COLON: DIVERTICULOSIS, COLON POLYP)    Surgeons: Osmani Goodman MD Anesthesiologist: Toi Hanna MD    Anesthesia Type: MAC ASA Status: 2            Anesthesia Type: MAC    Vitals Value Taken Time   BP 92/56 06/02/25 10:26   Temp  06/02/25 10:26   Pulse 61 06/02/25 10:26   Resp 16 06/02/25 10:26   SpO2 100 06/02/25 10:26           Patient Location: Endoscopy    Anesthesia Type: MAC    Airway Patency: patent    Postop Pain Control: adequate    Mental Status: mildly sedated but able to meaningfully participate in the post-anesthesia evaluation    Nausea/Vomiting: none    Cardiopulmonary/Hydration status: stable euvolemic    Complications: no apparent anesthesia related complications    Postop vital signs: stable    Dental Exam: Unchanged from Postop

## 2025-06-02 NOTE — ANESTHESIA PREPROCEDURE EVALUATION
PRE-OP EVALUATION    Patient Name: Molina Sahu    Admit Diagnosis: Left lower quadrant pain [R10.32]  Epigastric pain [R10.13]  Dysphagia, unspecified type [R13.10]  Heartburn [R12]  Change in stool caliber [R19.5]  Fatty liver [K76.0]  Elevated liver enzymes [R74.8]  Gastric ulcer without hemorrhage or perforation, unspecified chronicity [K25.9]    Pre-op Diagnosis: Left lower quadrant pain [R10.32]  Epigastric pain [R10.13]  Dysphagia, unspecified type [R13.10]  Heartburn [R12]  Change in stool caliber [R19.5]  Fatty liver [K76.0]  Elevated liver enzymes [R74.8]  Gastric ulcer without hemorrhage or perforation, unspecified chronicity [K25.9]    ESOPHAGOGASTRODUODENOSCOPY (EGD), COLONOSCOPY    Anesthesia Procedure: ESOPHAGOGASTRODUODENOSCOPY (EGD), COLONOSCOPY  COLONOSCOPY    Surgeons and Role:     * Osmani Goodman MD - Primary    Pre-op vitals reviewed.        Body mass index is 28.89 kg/m².    Current medications reviewed.  Hospital Medications:  Current Medications[1]    Outpatient Medications:   Prescriptions Prior to Admission[2]    Allergies: Codeine      Anesthesia Evaluation    Patient summary reviewed.    Anesthetic Complications  (+) history of anesthetic complications  History of: PONV       GI/Hepatic/Renal      (+) GERD                           Cardiovascular        Exercise tolerance: good     MET: >4                                           Endo/Other                                  Pulmonary      (+) asthma                     Neuro/Psych        (+) anxiety                      Patient Active Problem List:     Acute serous otitis media, unspecified ear     Arthralgia of hip     Atypical chest pain     Chronic right shoulder pain     Contact dermatitis     Disorder of bone     Fluid level behind tympanic membrane     Normal spontaneous vaginal delivery (HCC)     Spinal stenosis     Spondylosis of cervical spine     Tinea corporis     Dizziness     Dysphagia, unspecified     Abdominal pain,  epigastric     Breast microcalcifications     Breast pain, left     History of multiple miscarriages     Urticaria     Skin blushing/flushing            Past Surgical History[3]  Social Hx on file[4]  History   Drug Use Unknown     Available pre-op labs reviewed.               Airway      Mallampati: II  Mouth opening: >3 FB  TM distance: 4 - 6 cm  Neck ROM: full Cardiovascular      Rhythm: regular  Rate: normal     Dental    Dentition appears grossly intact         Pulmonary      Breath sounds clear to auscultation bilaterally.               Other findings              ASA: 2   Plan: MAC  NPO status verified and patient meets guidelines.    Post-procedure pain management plan discussed with surgeon and patient.      Plan/risks discussed with: patient                Present on Admission:  **None**             [1]   No current facility-administered medications on file as of 6/2/2025.   [2]   Medications Prior to Admission   Medication Sig Dispense Refill Last Dose/Taking    famotidine 10 MG Oral Tab Take 1 tablet (10 mg total) by mouth every morning.   Taking    Multiple Vitamins Essential Oral Tab Take 1 tablet by mouth daily.       Turmeric (QC TUMERIC COMPLEX OR) Take by mouth as needed.       OIL OF OREGANO OR Take by mouth as needed.       PEG 3350-KCl-Na Bicarb-NaCl 420 g Oral Recon Soln Take as directed. 1 each 0     MAGNESIUM OR Take by mouth as needed.       meclizine 25 MG Oral Tab Take 1 tablet (25 mg total) by mouth 3 (three) times daily as needed for Dizziness. 30 tablet 0     albuterol 108 (90 Base) MCG/ACT Inhalation Aero Soln Inhale 2 puffs into the lungs every 4 (four) hours.      [3]   Past Surgical History:  Procedure Laterality Date    Breast surgery      Colposcopy,bx cervix/endocerv curr      multiple    D & c      Egd      Excisional biopsy left  04/2021    Fibroadenoma    Fracture surgery      R. leg    Hysterectomy      Implant left  2005    Saline    Implant right  2005    Saline    Other  surgical history      Breast Augmentation    Other surgical history Bilateral     breast implants removed   [4]   Social History  Socioeconomic History    Marital status:    Tobacco Use    Smoking status: Former     Current packs/day: 0.00     Average packs/day: 1 pack/day for 28.0 years (28.0 ttl pk-yrs)     Types: Cigarettes     Start date: 1995     Quit date: 2023     Years since quittin.7    Smokeless tobacco: Never   Vaping Use    Vaping status: Some Days    Substances: Nicotine   Substance and Sexual Activity    Alcohol use: Never    Drug use: Never   Other Topics Concern    Caffeine Concern Yes     Comment: coffee 1 cups

## 2025-06-02 NOTE — OPERATIVE REPORT
Colonoscopy Operative Report  Patient Name: Molina Sahu  YOB: 1980  MRN: [id number]  Procedure: Colonoscopy with polypectomy  Pre-operative Diagnosis & Indication:   Change in stool caliber, bowel habits  Post-operative Diagnosis:   Colon polyp x1 s/p polypectomy  Hemorrhoids   Attending Endoscopist: Osmani Goodman M.D.  Informed Consent: The planned procedure(s), the explanation of the procedure, its expected benefits, the potential complications and risks, and the possible alternatives (including their benefits and risks) were discussed with the patient and/or patient's legal representation/power of  in full. The discussion of risks, not limited to but including bleeding, infection, perforation or tear in the gastrointestinal tract, adverse effects from anesthesia, and possible prolonged hospitalization, emergency surgery, risk of morbidity or mortality, and risk of missed lesion(s) were discussed with patient and/or patient's legal representation/power of .  The risks and benefits of not undergoing the procedure(s), and associated risk of potential missed or delay in diagnosis were also reviewed. Patient and/or patient's legal representation/power of . understands the missed rate of colonoscopy of polyps, lesions of 5-10% even in the best of circumstances and that although this is an accurate test, there are limitations to colonoscopy. Patient and/or patient's legal representation/power of  understood the proposed procedure(s), and elected to proceed with the procedure(s) with possible intervention and endotherapy (such as polypectomy, biopsy, control of bleeding, etc.) and its risks, benefits and alternatives and wish to proceed with procedure(s). Ample time was given to ask questions, and all questions answered in full to patient's and/or patient's legal representation/power of  satisfaction.   Physical Exam: General: No acute distress. Respiratory: no  respiratory distress.  Abdomen: Soft, non-tender, non distended. No rebound tenderness, no guarding.   A TIME OUT WAS COMPLETED prior to the procedure to confirm the patient, procedure and complete endoscopy safety procedure. Sedation: Monitored Anesthesia Care; ASA class per anesthesiology team Monitoring: Pulsoximetry, pulse, respirations, and blood pressure, vitals were monitored throughout the entire procedure by anesthesia team  Preparation Quality:  [place] Bowel Prep Score: 7  [Right 2 / Transverse 2 / Left 3 ]   Procedure: After achieving adequate sedation, and placing the patient in the left lateral decubitus position, a digital rectal examination was performed.  The lubricated tip of the colonoscope was then introduced into the rectum and advanced to the terminal ileum.  The appendiceal orifice and ileocecal valve were clearly and distinctly visualized, thus verifying the cecum.   The terminal ileum was  intubated.  The endoscope was then carefully withdrawn from the patient with careful visualization of the colonic mucosa to the best of my ability, with bowel preparation quality as noted above.  CO2 was used for insufflation during the procedure, and CO2 was suctioned to the best of my ability, during withdrawal of the endoscope.  When the endoscope reached the rectum, it was placed in a retroflexed position, and the rectal bulb was thus visualized.  The endoscope was righted, and air was suctioned from the colon to the best of my ability, as it was during withdrawn from the colon.  The endoscope was then removed from the patient.  The patient tolerated the procedure without apparent procedural complications.  The patient left the procedure room in stable condition for recovery.  Toleration: Patient tolerated procedure well   Complications: No immediate complications   Technical Difficulty:  The procedure was not technically difficult   Estimated Blood Loss: Minimal, less than 5mL of estimated blood loss.    Withdrawal time: Withdrawal of endoscope was 12 minutes  Findings and Therapeutics:  Terminal Ileum:  Normal mucosa of examined extent.   Colon:   One 4mm sessile polyp in the transverse colon. Complete polypectomy with cold snare. Polyp retrieved. Hemostasis.   Diverticulosis, left sided. Small and large. Non bleeding.   Rectum:   There were small sized, internal hemorrhoids seen on retroflexion.   Recommendations:    Post Colonoscopy/polypectomy precautions, watch for bleeding, infection, perforation, adverse drug reactions.    Follow up biopsy results   Repeat colonoscopy in 5 years, pending pathology  High fiber diet     Osmani Goodman MD  6/2/2025  10:22 AM

## 2025-06-03 LAB
LYME IGG WB INTERP: NEGATIVE
LYME IGM WB INTERP: NEGATIVE

## 2025-06-04 NOTE — TELEPHONE ENCOUNTER
Molina called.  Labs discussed.  CRP 0.8.  Normal is less than 0.5.  AVISE test completely normal.  ELIZABETH testing negative.  Rheumatoid factor testing negative.  TREVA testing negative.  Double-stranded DNA testing negative.  Anticardiolipin antibodies negative.  Antibeta 2 glycoprotein test negative.  Antithyroglobulin levels negative antithyroid peroxidase levels negative.  Sed rate 25 which is unimpressive.  Lyme test negative.  Patient gets episodes of redness in the skin with urticaria.  Suggest that she see Dr. Randall Noonan of dermatology.  Phone number [phone] for evaluation.  If Dr. Noonan is unhelpful then referred downtown to [facility] or [facility] rheumatology for further evaluation.  Patient is concerned that she might have mast cell disease.  I am not an expert in that I told her as such.  She would to find a doctor who knows a lot about that condition.  Dr. Rehman   SERAFIN

## 2025-06-12 NOTE — PROGRESS NOTES
-called patient, see tele note.   -Patient needs repeat colonoscopy in 5 years.  Please enter recall and update health maintenance.      EGD/EUS per other note.         ---    Shawn Auguste,    I wanted to summarize our discussion over the telephone today via this message.     You recently had an upper endoscopy (EGD) procedure completed with biopsies of the stomach and a colonoscopy procedure completed with biopsies of your colon.     The biopsies of the stomach show an erosion, which is inflammation.  As we discussed over the phone, I recommend an upper endoscopic ultrasound procedure with my colleague in 8 weeks for surveillance and further evaluation of this ulcer.    The biopsies obtained from your recent colonoscopy indicate that the polyp was an adenoma, which, although benign (no evidence of cancer), is considered potentially pre-cancerous and you will need long term follow-up.    I am advising you to undergo a repeat colonoscopy in 5 years, or sooner if you were to develop any new symptoms, and at periodic intervals thereafter. We will send you a reminder card when the time of your procedure is near.    Please call the office if you have any questions or concerns about these results.     Sincerely,    Osmani Goodman MD  [facility]  [phone]

## 2025-07-19 ENCOUNTER — LAB ENCOUNTER (OUTPATIENT)
Dept: LAB | Age: 45
End: 2025-07-19
Attending: NURSE PRACTITIONER
Payer: COMMERCIAL

## 2025-07-19 DIAGNOSIS — R63.4 WEIGHT LOSS: ICD-10-CM

## 2025-07-19 DIAGNOSIS — R14.0 ABDOMINAL BLOATING: Chronic | ICD-10-CM

## 2025-07-19 PROCEDURE — 82656 EL-1 FECAL QUAL/SEMIQ: CPT

## 2025-07-23 LAB — PANCREATIC ELAST FECAL: >800 UG ELAST./G

## 2025-07-24 ENCOUNTER — OFFICE VISIT (OUTPATIENT)
Dept: OBGYN CLINIC | Facility: CLINIC | Age: 45
End: 2025-07-24
Payer: COMMERCIAL

## 2025-07-24 VITALS
BODY MASS INDEX: 28.28 KG/M2 | WEIGHT: 176 LBS | SYSTOLIC BLOOD PRESSURE: 122 MMHG | DIASTOLIC BLOOD PRESSURE: 74 MMHG | HEIGHT: 66 IN | HEART RATE: 94 BPM

## 2025-07-24 DIAGNOSIS — Z76.89 ENCOUNTER TO ESTABLISH CARE: ICD-10-CM

## 2025-07-24 DIAGNOSIS — M53.3 COCCYDYNIA: Primary | ICD-10-CM

## 2025-07-24 PROCEDURE — 99204 OFFICE O/P NEW MOD 45 MIN: CPT | Performed by: NURSE PRACTITIONER

## 2025-07-24 NOTE — PROGRESS NOTES
Here for new gynecology visit.  44 year old G10 P 3.  Patient's last menstrual period was 2024 (approximate)..     Here to establish care    Her gynecologic history is significant for a recent history of a Hysterectomy for recurrent abnormal pap smear. She notes she feels she has had right sided numbness and pain down the front of her leg since the surgery and her prior MD does not feel this is related to her Hysterectomy. Her recommended she see neurology which she has not yet had a chance to do.     She is struggling with numerous medical concerns at this time and grasping for answers. She has felt fatigued among many other concerns including unexplained weight loss, GI concerns, headaches, abnormal lab findings, ER visits for facial flushing accompanied by dizziness, chest pressure/ neck pain and shortness of breath where she feels like she is having a stroke. After extensive work ups they found her cortisol levels were low and she is working with endocrinology.    Last pap smear was in  and it was abnormal.       OB Hx:  3 , 7 SAB.    Family gyn hx:  neg.   Family breast hx:  maternal grandmother and paternal aunt.  Last mammogram in .  Screening labs .  Colonoscopy     Past Medical History[1]    Past Surgical History[2]    Medications Ordered Prior to Encounter[3]    OB History    Para Term  AB Living   10 3 3  7 3   SAB IAB Ectopic Multiple Live Births   7    3      # Outcome Date GA Lbr Pradeep/2nd Weight Sex Type Anes PTL Lv   10 Term     F NORMAL SPONT   NAVYA   9 SAB     U SAB   ND   8 SAB     U SAB   ND   7 Term     M NORMAL SPONT   NAVYA   6 SAB      SAB   ND   5 SAB     U SAB   ND   4 SAB     U SAB   ND   3 SAB     U SAB   ND   2 Term     F NORMAL SPONT   NAVYA   1 SAB     U SAB   ND       ROS:    General:  No wt loss, wt gain, appetite changes.  Eyes:  No vision changes.  Ears:  No pain, hearing problems.  Throat:  No soreness or difficulty swallowing. No hx thyroid  dysfunction.  Lungs:  No SOB, cough, wheezing, pneumonia in past.  Heart:  No chest pain, palpitations.  Breasts:  No pain, lumps or secretions.  GI:   No nausea, emesis, reflux, liver disease, GB problems, issues with diarrhea or constipation.  :   No urgency, frequency, LAKSHMI, bladder problems in past.  Extremities:  No pain, swelling, arthralgias, joint swelling.  Neurological:  No headaches, depression, anxiety, migraines, seizure disorders, behavioral problems.                   /74   Pulse 94   Ht 66\"   Wt 176 lb (79.8 kg)   LMP 03/01/2024 (Approximate)   BMI 28.41 kg/m²     Exam deferred    IMP/PLAN:      1. Encounter to establish care  She is due for annual and pap in the fall    2. Coccydynia  - Pelvic Floor Therapy - [facility] - External      I suggested she consider a [facility] referral to establish with a counseling to help with health anxiety and trauma from family losses and stress. She will call if she desires    45 minutes was spent discussing patients medical history and her concerns today    Return in September for annual exam and pap.         [1]   Past Medical History:   Abdominal pain    Adrenal crisis (HCC)    Pt reports having adrenal crisis 4 days after getting a steroid injection in the back 7/2025    Anxiety    Arthritis    Asthma (HCC)    Atypical mole    Back pain    Back problem    arthritis    Bad breath    Belching    Bleeding nose    Bloating    Body piercing    Cancer (HCC)    skin    Change in hair    Chest pain    Chronic cough    Decorative tattoo    Diarrhea, unspecified    Disorder of liver    fatty liver    Dizziness    Factor V Leiden (HCC)    Fatigue    Flatulence/gas pain/belching    Hearing loss    Heart palpitations    Heartburn    Hiatal hernia    High cholesterol    History of eating disorder    History of stomach ulcers    gastric ulcer    Hoarseness, chronic    Hx of motion sickness    Indigestion    Leaking of urine    Leg swelling     MTHFR gene mutation    Nausea    Osteoporosis    Pain in joints    PONV (postoperative nausea and vomiting)    POTS (postural orthostatic tachycardia syndrome)    Problems with swallowing    Seizures (HCC)    Skin blushing/flushing    Sleep disturbance    Sputum production    Stress    Umbilical hernia    Uncomfortable fullness after meals    Weight gain    Wheezing   [2]   Past Surgical History:  Procedure Laterality Date    Breast surgery      Colonoscopy N/A 06/02/2025    Procedure: COLONOSCOPY;  Surgeon: Osmani Goodamn MD;  Location:  ENDOSCOPY    Colposcopy,bx cervix/endocerv curr      multiple    D & c      Egd      Excisional biopsy left  04/2021    Fibroadenoma    Fracture surgery      R. leg    Hysterectomy  2024    Per Pt, ovaries and fallopian tubes remain intact    Implant left  2005    Saline    Implant right  2005    Saline    Other surgical history      Breast Augmentation    Other surgical history Bilateral 2021    breast implants removed   [3]   Current Outpatient Medications on File Prior to Visit   Medication Sig Dispense Refill    Esomeprazole Magnesium 40 MG Oral Capsule Delayed Release Take 1 capsule (40 mg total) by mouth 2 (two) times daily before meals. Until we do your repeat EGD. 180 capsule 2    Multiple Vitamins Essential Oral Tab Take 1 tablet by mouth daily.      Turmeric (QC TUMERIC COMPLEX OR) Take by mouth as needed.      OIL OF OREGANO OR Take by mouth as needed.      MAGNESIUM OR Take by mouth as needed.      meclizine 25 MG Oral Tab Take 1 tablet (25 mg total) by mouth 3 (three) times daily as needed for Dizziness. 30 tablet 0    albuterol 108 (90 Base) MCG/ACT Inhalation Aero Soln Inhale 2 puffs into the lungs every 4 (four) hours.      PEG 3350-KCl-Na Bicarb-NaCl 420 g Oral Recon Soln Take as directed. (Patient not taking: Reported on 7/24/2025) 1 each 0     No current facility-administered medications on file prior to visit.

## 2025-07-29 ENCOUNTER — HOSPITAL ENCOUNTER (OUTPATIENT)
Facility: HOSPITAL | Age: 45
Setting detail: HOSPITAL OUTPATIENT SURGERY
Discharge: HOME | End: 2025-07-29
Attending: INTERNAL MEDICINE | Admitting: INTERNAL MEDICINE

## 2025-07-29 ENCOUNTER — ANESTHESIA EVENT (OUTPATIENT)
Dept: ENDOSCOPY | Facility: HOSPITAL | Age: 45
End: 2025-07-29
Payer: COMMERCIAL

## 2025-07-29 ENCOUNTER — ANESTHESIA (OUTPATIENT)
Dept: ENDOSCOPY | Facility: HOSPITAL | Age: 45
End: 2025-07-29
Payer: COMMERCIAL

## 2025-07-29 VITALS
BODY MASS INDEX: 28.13 KG/M2 | WEIGHT: 175 LBS | OXYGEN SATURATION: 97 % | SYSTOLIC BLOOD PRESSURE: 106 MMHG | HEIGHT: 66 IN | DIASTOLIC BLOOD PRESSURE: 64 MMHG | HEART RATE: 52 BPM

## 2025-07-29 RX ORDER — NALOXONE HYDROCHLORIDE 0.4 MG/ML
0.08 INJECTION, SOLUTION INTRAMUSCULAR; INTRAVENOUS; SUBCUTANEOUS ONCE AS NEEDED
Status: DISCONTINUED | OUTPATIENT
Start: 2025-07-29 | End: 2025-07-29

## 2025-07-29 RX ORDER — SODIUM CHLORIDE, SODIUM LACTATE, POTASSIUM CHLORIDE, CALCIUM CHLORIDE 600; 310; 30; 20 MG/100ML; MG/100ML; MG/100ML; MG/100ML
INJECTION, SOLUTION INTRAVENOUS CONTINUOUS
Status: DISCONTINUED | OUTPATIENT
Start: 2025-07-29 | End: 2025-07-29

## 2025-07-30 ENCOUNTER — LAB ENCOUNTER (OUTPATIENT)
Dept: LAB | Age: 45
End: 2025-07-30
Attending: INTERNAL MEDICINE

## 2025-07-30 DIAGNOSIS — R73.09 ELEVATED HEMOGLOBIN A1C: Primary | ICD-10-CM

## 2025-07-30 DIAGNOSIS — R79.89 HYPOURICEMIA: ICD-10-CM

## 2025-07-30 DIAGNOSIS — R42 LIGHTHEADEDNESS: ICD-10-CM

## 2025-07-30 DIAGNOSIS — E78.01 FAMILIAL HYPERCHOLESTEROLEMIA: ICD-10-CM

## 2025-07-30 DIAGNOSIS — R00.2 PALPITATIONS: ICD-10-CM

## 2025-07-30 LAB
ALBUMIN SERPL-MCNC: 4.2 G/DL (ref 3.2–4.8)
ALBUMIN/GLOB SERPL: 1.6 (ref 1–2)
ALP LIVER SERPL-CCNC: 69 U/L (ref 37–98)
ALT SERPL-CCNC: 25 U/L (ref 10–49)
ANION GAP SERPL CALC-SCNC: 8 MMOL/L (ref 0–18)
AST SERPL-CCNC: 18 U/L (ref ?–34)
BILIRUB SERPL-MCNC: 0.2 MG/DL (ref 0.3–1.2)
BUN BLD-MCNC: 9 MG/DL (ref 9–23)
CALCIUM BLD-MCNC: 9.5 MG/DL (ref 8.7–10.6)
CHLORIDE SERPL-SCNC: 105 MMOL/L (ref 98–112)
CHOLEST SERPL-MCNC: 169 MG/DL (ref ?–200)
CO2 SERPL-SCNC: 28 MMOL/L (ref 21–32)
CREAT BLD-MCNC: 0.88 MG/DL (ref 0.55–1.02)
EGFRCR SERPLBLD CKD-EPI 2021: 84 ML/MIN/1.73M2 (ref 60–?)
ERYTHROCYTE [SEDIMENTATION RATE] IN BLOOD: 19 MM/HR (ref 0–20)
EST. AVERAGE GLUCOSE BLD GHB EST-MCNC: 111 MG/DL (ref 68–126)
FASTING PATIENT LIPID ANSWER: YES
FASTING STATUS PATIENT QL REPORTED: YES
GLOBULIN PLAS-MCNC: 2.6 G/DL (ref 2–3.5)
GLUCOSE BLD-MCNC: 87 MG/DL (ref 70–99)
HBA1C MFR BLD: 5.5 % (ref ?–5.7)
HDLC SERPL-MCNC: 43 MG/DL (ref 40–59)
LDLC SERPL CALC-MCNC: 103 MG/DL (ref ?–100)
MAGNESIUM SERPL-MCNC: 1.9 MG/DL (ref 1.6–2.6)
NONHDLC SERPL-MCNC: 126 MG/DL (ref ?–130)
OSMOLALITY SERPL CALC.SUM OF ELEC: 290 MOSM/KG (ref 275–295)
POTASSIUM SERPL-SCNC: 4.2 MMOL/L (ref 3.5–5.1)
PROT SERPL-MCNC: 6.8 G/DL (ref 5.7–8.2)
SODIUM SERPL-SCNC: 141 MMOL/L (ref 136–145)
T4 FREE SERPL-MCNC: 1.3 NG/DL (ref 0.8–1.7)
TRIGL SERPL-MCNC: 126 MG/DL (ref 30–149)
TSI SER-ACNC: 1.51 UIU/ML (ref 0.55–4.78)
VLDLC SERPL CALC-MCNC: 21 MG/DL (ref 0–30)

## 2025-07-30 PROCEDURE — 36415 COLL VENOUS BLD VENIPUNCTURE: CPT

## 2025-07-30 PROCEDURE — 80061 LIPID PANEL: CPT

## 2025-07-30 PROCEDURE — 83036 HEMOGLOBIN GLYCOSYLATED A1C: CPT

## 2025-07-30 PROCEDURE — 84439 ASSAY OF FREE THYROXINE: CPT

## 2025-07-30 PROCEDURE — 83695 ASSAY OF LIPOPROTEIN(A): CPT

## 2025-07-30 PROCEDURE — 80053 COMPREHEN METABOLIC PANEL: CPT

## 2025-07-30 PROCEDURE — 84443 ASSAY THYROID STIM HORMONE: CPT

## 2025-07-30 PROCEDURE — 85652 RBC SED RATE AUTOMATED: CPT

## 2025-07-30 PROCEDURE — 83735 ASSAY OF MAGNESIUM: CPT

## 2025-07-31 LAB — LIPOPROTEIN (A): 16.7 NMOL/L

## 2025-08-29 ENCOUNTER — HOSPITAL ENCOUNTER (OUTPATIENT)
Dept: CT IMAGING | Age: 45
Discharge: HOME | End: 2025-08-29
Attending: NURSE PRACTITIONER

## 2025-08-29 DIAGNOSIS — R16.0 HEPATOMEGALY: ICD-10-CM

## 2025-08-29 DIAGNOSIS — R14.0 ABDOMINAL BLOATING: Chronic | ICD-10-CM

## 2025-08-29 DIAGNOSIS — K25.3 ACUTE GASTRIC ULCER WITHOUT HEMORRHAGE OR PERFORATION: ICD-10-CM

## 2025-08-29 DIAGNOSIS — R63.4 WEIGHT LOSS: ICD-10-CM

## 2025-08-29 DIAGNOSIS — R63.0 APPETITE IMPAIRED: ICD-10-CM

## 2025-08-29 DIAGNOSIS — R10.32 LEFT LOWER QUADRANT PAIN: ICD-10-CM

## 2025-08-29 PROCEDURE — 74177 CT ABD & PELVIS W/CONTRAST: CPT | Performed by: NURSE PRACTITIONER

## (undated) DEVICE — 3M™ RED DOT™ MONITORING ELECTRODE WITH FOAM TAPE AND STICKY GEL, 50/BAG, 20/CASE, 72/PLT 2570: Brand: RED DOT™

## (undated) DEVICE — BITEBLOCK ENDOSCP 60FR MAXI STRP

## (undated) DEVICE — KIT CUSTOM ENDOPROCEDURE STERIS

## (undated) DEVICE — CANISTER SUCT 1200CC LID BLU HRD ADPT AUTO

## (undated) DEVICE — V2 SPECIMEN COLLECTION MANIFOLD KIT: Brand: NEPTUNE

## (undated) DEVICE — ELECTRODE EKG W3.5XL4CM ABRAD W1.25XL1.5IN

## (undated) DEVICE — KIT VLV 5 PC AIR H2O SUCT BX ENDOGATOR CONN

## (undated) DEVICE — BALLOON HEMOSTATIC EUS LINEAR

## (undated) DEVICE — LASSO POLYPECTOMY SNARE: Brand: LASSO

## (undated) DEVICE — 1200CC GUARDIAN II: Brand: GUARDIAN

## (undated) DEVICE — KIT MFLD FOR SPEC COLL

## (undated) DEVICE — GIJAW SINGLE-USE BIOPSY FORCEPS WITH NEEDLE: Brand: GIJAW

## (undated) DEVICE — CANNULA NSL AD W/ FLTR 14FT O2/CO2

## (undated) DEVICE — 10FT COMBINED O2 DELIVERY/CO2 MONITORING. FILTER WITH MICROSTREAM TYPE LUER: Brand: DUAL ADULT NASAL CANNULA

## (undated) NOTE — LETTER
[facility], Hilaria Archibald   Date:   5/18/2023     Name:   Lila Alicea    YOB: 1980   MRN:   [id number]       WHERE IS YOUR PAIN NOW? Juliette the areas on your body where you feel the described sensations. Use the appropriate symbol. Pedro Salas the areas of radiation. Include all affected areas. Just to complete the picture, please draw in the face. ACHE:  ^ ^ ^   NUMBNESS:  0000   PINS & NEEDLES:  = = = =                              ^ ^ ^                       0000              = = = =                                    ^ ^ ^                       0000            = = = =      BURNING:  XXXX   STABBING: ////                  XXXX                ////                         XXXX          ////     Please juliette the line below indicating your degree of pain right now  with 0 being no pain 10 being the worst pain possible.                                          0             1             2              3             4              5              6              7             8             9             10         Patient Signature: